# Patient Record
Sex: FEMALE | Race: WHITE | HISPANIC OR LATINO | Employment: FULL TIME | URBAN - METROPOLITAN AREA
[De-identification: names, ages, dates, MRNs, and addresses within clinical notes are randomized per-mention and may not be internally consistent; named-entity substitution may affect disease eponyms.]

---

## 2017-07-08 NOTE — Clinical Note
July 10, 2017        Molly Olson  [address] [place] [address]        Dear Molly:    This letter is to inform you the you are due for an annual appointment. Please contact our scheduling department at [phone] To schedule       If you have any questions or concerns, please don't hesitate to call.        Sincerely,        PATIENCE Bonner.    Electronically Signed

## 2017-07-10 RX ORDER — CITALOPRAM HYDROBROMIDE 10 MG/1
TABLET ORAL
Qty: 30 TAB | Refills: 1 | Status: ON HOLD | OUTPATIENT
Start: 2017-07-10 | End: 2021-06-22

## 2017-10-03 DIAGNOSIS — R79.89 ELEVATED TSH: ICD-10-CM

## 2017-10-03 RX ORDER — LEVOTHYROXINE SODIUM 0.05 MG/1
50 TABLET ORAL
Qty: 30 TAB | Refills: 1 | Status: ON HOLD | OUTPATIENT
Start: 2017-10-03 | End: 2021-06-22

## 2021-06-21 ENCOUNTER — HOSPITAL ENCOUNTER (EMERGENCY)
Facility: MEDICAL CENTER | Age: 36
End: 2021-06-21
Attending: EMERGENCY MEDICINE
Payer: COMMERCIAL

## 2021-06-21 ENCOUNTER — HOSPITAL ENCOUNTER (INPATIENT)
Facility: MEDICAL CENTER | Age: 36
LOS: 4 days | DRG: 519 | End: 2021-06-26
Attending: INTERNAL MEDICINE | Admitting: INTERNAL MEDICINE
Payer: COMMERCIAL

## 2021-06-21 VITALS
TEMPERATURE: 96.6 F | DIASTOLIC BLOOD PRESSURE: 62 MMHG | SYSTOLIC BLOOD PRESSURE: 117 MMHG | WEIGHT: 231.48 LBS | RESPIRATION RATE: 16 BRPM | OXYGEN SATURATION: 95 % | BODY MASS INDEX: 33.21 KG/M2 | HEART RATE: 94 BPM

## 2021-06-21 DIAGNOSIS — M54.9 INTRACTABLE BACK PAIN: ICD-10-CM

## 2021-06-21 DIAGNOSIS — M54.16 LUMBAR RADICULOPATHY, ACUTE: ICD-10-CM

## 2021-06-21 LAB
ALBUMIN SERPL BCP-MCNC: 4.2 G/DL (ref 3.2–4.9)
ALBUMIN/GLOB SERPL: 1.2 G/DL
ALP SERPL-CCNC: 85 U/L (ref 30–99)
ALT SERPL-CCNC: 21 U/L (ref 2–50)
ANION GAP SERPL CALC-SCNC: 12 MMOL/L (ref 7–16)
AST SERPL-CCNC: 15 U/L (ref 12–45)
BASOPHILS # BLD AUTO: 0.3 % (ref 0–1.8)
BASOPHILS # BLD: 0.04 K/UL (ref 0–0.12)
BILIRUB SERPL-MCNC: 0.6 MG/DL (ref 0.1–1.5)
BUN SERPL-MCNC: 15 MG/DL (ref 8–22)
CALCIUM SERPL-MCNC: 9.1 MG/DL (ref 8.4–10.2)
CHLORIDE SERPL-SCNC: 106 MMOL/L (ref 96–112)
CO2 SERPL-SCNC: 19 MMOL/L (ref 20–33)
CREAT SERPL-MCNC: 0.71 MG/DL (ref 0.5–1.4)
EOSINOPHIL # BLD AUTO: 0 K/UL (ref 0–0.51)
EOSINOPHIL NFR BLD: 0 % (ref 0–6.9)
ERYTHROCYTE [DISTWIDTH] IN BLOOD BY AUTOMATED COUNT: 38.5 FL (ref 35.9–50)
GLOBULIN SER CALC-MCNC: 3.4 G/DL (ref 1.9–3.5)
GLUCOSE SERPL-MCNC: 104 MG/DL (ref 65–99)
HCG SERPL QL: NEGATIVE
HCT VFR BLD AUTO: 47.3 % (ref 37–47)
HGB BLD-MCNC: 16.7 G/DL (ref 12–16)
IMM GRANULOCYTES # BLD AUTO: 0.07 K/UL (ref 0–0.11)
IMM GRANULOCYTES NFR BLD AUTO: 0.5 % (ref 0–0.9)
LYMPHOCYTES # BLD AUTO: 1.8 K/UL (ref 1–4.8)
LYMPHOCYTES NFR BLD: 12.1 % (ref 22–41)
MCH RBC QN AUTO: 28.9 PG (ref 27–33)
MCHC RBC AUTO-ENTMCNC: 35.3 G/DL (ref 33.6–35)
MCV RBC AUTO: 82 FL (ref 81.4–97.8)
MONOCYTES # BLD AUTO: 0.87 K/UL (ref 0–0.85)
MONOCYTES NFR BLD AUTO: 5.9 % (ref 0–13.4)
NEUTROPHILS # BLD AUTO: 12.07 K/UL (ref 2–7.15)
NEUTROPHILS NFR BLD: 81.2 % (ref 44–72)
NRBC # BLD AUTO: 0 K/UL
NRBC BLD-RTO: 0 /100 WBC
PLATELET # BLD AUTO: 315 K/UL (ref 164–446)
PMV BLD AUTO: 9.8 FL (ref 9–12.9)
POTASSIUM SERPL-SCNC: 3.9 MMOL/L (ref 3.6–5.5)
PROT SERPL-MCNC: 7.6 G/DL (ref 6–8.2)
RBC # BLD AUTO: 5.77 M/UL (ref 4.2–5.4)
SARS-COV+SARS-COV-2 AG RESP QL IA.RAPID: NOTDETECTED
SODIUM SERPL-SCNC: 137 MMOL/L (ref 135–145)
SPECIMEN SOURCE: NORMAL
WBC # BLD AUTO: 14.9 K/UL (ref 4.8–10.8)

## 2021-06-21 PROCEDURE — 99222 1ST HOSP IP/OBS MODERATE 55: CPT | Performed by: INTERNAL MEDICINE

## 2021-06-21 PROCEDURE — 87426 SARSCOV CORONAVIRUS AG IA: CPT

## 2021-06-21 PROCEDURE — 99285 EMERGENCY DEPT VISIT HI MDM: CPT

## 2021-06-21 PROCEDURE — G0378 HOSPITAL OBSERVATION PER HR: HCPCS

## 2021-06-21 PROCEDURE — 36415 COLL VENOUS BLD VENIPUNCTURE: CPT

## 2021-06-21 PROCEDURE — 700111 HCHG RX REV CODE 636 W/ 250 OVERRIDE (IP): Performed by: EMERGENCY MEDICINE

## 2021-06-21 PROCEDURE — 96374 THER/PROPH/DIAG INJ IV PUSH: CPT

## 2021-06-21 PROCEDURE — 85025 COMPLETE CBC W/AUTO DIFF WBC: CPT

## 2021-06-21 PROCEDURE — U0005 INFEC AGEN DETEC AMPLI PROBE: HCPCS

## 2021-06-21 PROCEDURE — 84703 CHORIONIC GONADOTROPIN ASSAY: CPT

## 2021-06-21 PROCEDURE — 80053 COMPREHEN METABOLIC PANEL: CPT

## 2021-06-21 PROCEDURE — 96375 TX/PRO/DX INJ NEW DRUG ADDON: CPT

## 2021-06-21 PROCEDURE — U0003 INFECTIOUS AGENT DETECTION BY NUCLEIC ACID (DNA OR RNA); SEVERE ACUTE RESPIRATORY SYNDROME CORONAVIRUS 2 (SARS-COV-2) (CORONAVIRUS DISEASE [COVID-19]), AMPLIFIED PROBE TECHNIQUE, MAKING USE OF HIGH THROUGHPUT TECHNOLOGIES AS DESCRIBED BY CMS-2020-01-R: HCPCS

## 2021-06-21 PROCEDURE — C9803 HOPD COVID-19 SPEC COLLECT: HCPCS | Performed by: EMERGENCY MEDICINE

## 2021-06-21 RX ORDER — CYCLOBENZAPRINE HCL 10 MG
10 TABLET ORAL 3 TIMES DAILY PRN
COMMUNITY

## 2021-06-21 RX ORDER — LORAZEPAM 2 MG/ML
INJECTION INTRAMUSCULAR
Status: DISCONTINUED
Start: 2021-06-21 | End: 2021-06-21 | Stop reason: HOSPADM

## 2021-06-21 RX ORDER — THYROID 30 MG/1
30 TABLET ORAL DAILY
COMMUNITY

## 2021-06-21 RX ORDER — HYDROMORPHONE HYDROCHLORIDE 1 MG/ML
1 INJECTION, SOLUTION INTRAMUSCULAR; INTRAVENOUS; SUBCUTANEOUS
Status: DISCONTINUED | OUTPATIENT
Start: 2021-06-21 | End: 2021-06-21 | Stop reason: HOSPADM

## 2021-06-21 RX ORDER — LORAZEPAM 2 MG/ML
2 INJECTION INTRAMUSCULAR ONCE
Status: COMPLETED | OUTPATIENT
Start: 2021-06-21 | End: 2021-06-21

## 2021-06-21 RX ORDER — GABAPENTIN 100 MG/1
100 CAPSULE ORAL 3 TIMES DAILY
COMMUNITY

## 2021-06-21 RX ORDER — KETOROLAC TROMETHAMINE 30 MG/ML
30 INJECTION, SOLUTION INTRAMUSCULAR; INTRAVENOUS ONCE
Status: COMPLETED | OUTPATIENT
Start: 2021-06-21 | End: 2021-06-21

## 2021-06-21 RX ORDER — METHYLPREDNISOLONE 4 MG/1
4-24 TABLET ORAL DAILY
Status: ON HOLD | COMMUNITY
Start: 2021-06-20 | End: 2021-06-26

## 2021-06-21 RX ADMIN — LORAZEPAM 2 MG: 2 INJECTION INTRAMUSCULAR; INTRAVENOUS at 18:50

## 2021-06-21 RX ADMIN — KETOROLAC TROMETHAMINE 30 MG: 30 INJECTION, SOLUTION INTRAMUSCULAR at 20:24

## 2021-06-21 RX ADMIN — HYDROMORPHONE HYDROCHLORIDE 1 MG: 1 INJECTION, SOLUTION INTRAMUSCULAR; INTRAVENOUS; SUBCUTANEOUS at 20:25

## 2021-06-21 ASSESSMENT — LIFESTYLE VARIABLES
TOTAL SCORE: 0
DOES PATIENT WANT TO STOP DRINKING: NO
HOW MANY TIMES IN THE PAST YEAR HAVE YOU HAD 5 OR MORE DRINKS IN A DAY: 0
ON A TYPICAL DAY WHEN YOU DRINK ALCOHOL HOW MANY DRINKS DO YOU HAVE: 2
EVER FELT BAD OR GUILTY ABOUT YOUR DRINKING: NO
HAVE YOU EVER FELT YOU SHOULD CUT DOWN ON YOUR DRINKING: NO
TOTAL SCORE: 0
CONSUMPTION TOTAL: NEGATIVE
AVERAGE NUMBER OF DAYS PER WEEK YOU HAVE A DRINK CONTAINING ALCOHOL: 1
EVER HAD A DRINK FIRST THING IN THE MORNING TO STEADY YOUR NERVES TO GET RID OF A HANGOVER: NO
TOTAL SCORE: 0
HAVE PEOPLE ANNOYED YOU BY CRITICIZING YOUR DRINKING: NO
ALCOHOL_USE: YES

## 2021-06-21 ASSESSMENT — PATIENT HEALTH QUESTIONNAIRE - PHQ9
8. MOVING OR SPEAKING SO SLOWLY THAT OTHER PEOPLE COULD HAVE NOTICED. OR THE OPPOSITE, BEING SO FIGETY OR RESTLESS THAT YOU HAVE BEEN MOVING AROUND A LOT MORE THAN USUAL: NEARLY EVERY DAY
1. LITTLE INTEREST OR PLEASURE IN DOING THINGS: NEARLY EVERY DAY
3. TROUBLE FALLING OR STAYING ASLEEP OR SLEEPING TOO MUCH: NOT AT ALL
7. TROUBLE CONCENTRATING ON THINGS, SUCH AS READING THE NEWSPAPER OR WATCHING TELEVISION: NOT AT ALL
SUM OF ALL RESPONSES TO PHQ9 QUESTIONS 1 AND 2: 6
4. FEELING TIRED OR HAVING LITTLE ENERGY: NEARLY EVERY DAY
SUM OF ALL RESPONSES TO PHQ QUESTIONS 1-9: 15
9. THOUGHTS THAT YOU WOULD BE BETTER OFF DEAD, OR OF HURTING YOURSELF: NOT AT ALL
5. POOR APPETITE OR OVEREATING: NEARLY EVERY DAY
6. FEELING BAD ABOUT YOURSELF - OR THAT YOU ARE A FAILURE OR HAVE LET YOURSELF OR YOUR FAMILY DOWN: NOT AL ALL
2. FEELING DOWN, DEPRESSED, IRRITABLE, OR HOPELESS: NEARLY EVERY DAY

## 2021-06-21 ASSESSMENT — PAIN DESCRIPTION - PAIN TYPE: TYPE: ACUTE PAIN

## 2021-06-21 ASSESSMENT — FIBROSIS 4 INDEX: FIB4 SCORE: 0.38

## 2021-06-22 PROBLEM — E66.01 MORBID OBESITY (HCC): Status: ACTIVE | Noted: 2021-06-22

## 2021-06-22 PROBLEM — R26.9 GAIT DISORDER: Status: ACTIVE | Noted: 2021-06-22

## 2021-06-22 PROBLEM — M54.9 INTRACTABLE BACK PAIN: Status: ACTIVE | Noted: 2021-06-22

## 2021-06-22 LAB
MAGNESIUM SERPL-MCNC: 2.2 MG/DL (ref 1.5–2.5)
SARS-COV-2 RNA RESP QL NAA+PROBE: NOTDETECTED
SPECIMEN SOURCE: NORMAL

## 2021-06-22 PROCEDURE — 96375 TX/PRO/DX INJ NEW DRUG ADDON: CPT

## 2021-06-22 PROCEDURE — 700111 HCHG RX REV CODE 636 W/ 250 OVERRIDE (IP): Performed by: PHYSICIAN ASSISTANT

## 2021-06-22 PROCEDURE — 700105 HCHG RX REV CODE 258: Performed by: STUDENT IN AN ORGANIZED HEALTH CARE EDUCATION/TRAINING PROGRAM

## 2021-06-22 PROCEDURE — 83735 ASSAY OF MAGNESIUM: CPT

## 2021-06-22 PROCEDURE — 700102 HCHG RX REV CODE 250 W/ 637 OVERRIDE(OP): Performed by: PHYSICIAN ASSISTANT

## 2021-06-22 PROCEDURE — 770006 HCHG ROOM/CARE - MED/SURG/GYN SEMI*

## 2021-06-22 PROCEDURE — 700102 HCHG RX REV CODE 250 W/ 637 OVERRIDE(OP): Performed by: INTERNAL MEDICINE

## 2021-06-22 PROCEDURE — 96376 TX/PRO/DX INJ SAME DRUG ADON: CPT

## 2021-06-22 PROCEDURE — A9270 NON-COVERED ITEM OR SERVICE: HCPCS | Performed by: INTERNAL MEDICINE

## 2021-06-22 PROCEDURE — A9270 NON-COVERED ITEM OR SERVICE: HCPCS | Performed by: NEUROLOGICAL SURGERY

## 2021-06-22 PROCEDURE — 99232 SBSQ HOSP IP/OBS MODERATE 35: CPT | Performed by: STUDENT IN AN ORGANIZED HEALTH CARE EDUCATION/TRAINING PROGRAM

## 2021-06-22 PROCEDURE — 96372 THER/PROPH/DIAG INJ SC/IM: CPT

## 2021-06-22 PROCEDURE — A9270 NON-COVERED ITEM OR SERVICE: HCPCS | Performed by: PHYSICIAN ASSISTANT

## 2021-06-22 PROCEDURE — 700102 HCHG RX REV CODE 250 W/ 637 OVERRIDE(OP): Performed by: NEUROLOGICAL SURGERY

## 2021-06-22 PROCEDURE — 36415 COLL VENOUS BLD VENIPUNCTURE: CPT

## 2021-06-22 PROCEDURE — 700111 HCHG RX REV CODE 636 W/ 250 OVERRIDE (IP): Performed by: INTERNAL MEDICINE

## 2021-06-22 RX ORDER — PROCHLORPERAZINE EDISYLATE 5 MG/ML
5-10 INJECTION INTRAMUSCULAR; INTRAVENOUS EVERY 4 HOURS PRN
Status: DISCONTINUED | OUTPATIENT
Start: 2021-06-22 | End: 2021-06-26 | Stop reason: HOSPADM

## 2021-06-22 RX ORDER — OXYCODONE HYDROCHLORIDE 5 MG/1
5 TABLET ORAL
Status: DISCONTINUED | OUTPATIENT
Start: 2021-06-22 | End: 2021-06-25

## 2021-06-22 RX ORDER — OXYCODONE HYDROCHLORIDE 5 MG/1
2.5 TABLET ORAL
Status: DISCONTINUED | OUTPATIENT
Start: 2021-06-22 | End: 2021-06-25

## 2021-06-22 RX ORDER — GABAPENTIN 100 MG/1
100 CAPSULE ORAL 3 TIMES DAILY
Status: DISCONTINUED | OUTPATIENT
Start: 2021-06-22 | End: 2021-06-22

## 2021-06-22 RX ORDER — ACETAMINOPHEN 500 MG
1000 TABLET ORAL EVERY 12 HOURS PRN
Status: ON HOLD | COMMUNITY
End: 2021-06-26

## 2021-06-22 RX ORDER — ONDANSETRON 2 MG/ML
4 INJECTION INTRAMUSCULAR; INTRAVENOUS EVERY 4 HOURS PRN
Status: DISCONTINUED | OUTPATIENT
Start: 2021-06-22 | End: 2021-06-25

## 2021-06-22 RX ORDER — LEVOTHYROXINE SODIUM 0.05 MG/1
50 TABLET ORAL
Status: DISCONTINUED | OUTPATIENT
Start: 2021-06-22 | End: 2021-06-26 | Stop reason: HOSPADM

## 2021-06-22 RX ORDER — ACETAMINOPHEN 325 MG/1
650 TABLET ORAL EVERY 6 HOURS PRN
Status: DISCONTINUED | OUTPATIENT
Start: 2021-06-22 | End: 2021-06-26 | Stop reason: HOSPADM

## 2021-06-22 RX ORDER — AMOXICILLIN 250 MG
2 CAPSULE ORAL 2 TIMES DAILY
Status: DISCONTINUED | OUTPATIENT
Start: 2021-06-22 | End: 2021-06-25

## 2021-06-22 RX ORDER — PROMETHAZINE HYDROCHLORIDE 25 MG/1
12.5-25 TABLET ORAL EVERY 4 HOURS PRN
Status: DISCONTINUED | OUTPATIENT
Start: 2021-06-22 | End: 2021-06-25

## 2021-06-22 RX ORDER — SODIUM CHLORIDE 9 MG/ML
INJECTION, SOLUTION INTRAVENOUS CONTINUOUS
Status: DISCONTINUED | OUTPATIENT
Start: 2021-06-22 | End: 2021-06-24

## 2021-06-22 RX ORDER — KETOROLAC TROMETHAMINE 30 MG/ML
15 INJECTION, SOLUTION INTRAMUSCULAR; INTRAVENOUS EVERY 6 HOURS
Status: COMPLETED | OUTPATIENT
Start: 2021-06-23 | End: 2021-06-23

## 2021-06-22 RX ORDER — ONDANSETRON 4 MG/1
4 TABLET, ORALLY DISINTEGRATING ORAL EVERY 4 HOURS PRN
Status: DISCONTINUED | OUTPATIENT
Start: 2021-06-22 | End: 2021-06-25

## 2021-06-22 RX ORDER — DIAZEPAM 2 MG/1
2 TABLET ORAL EVERY 6 HOURS PRN
Status: DISCONTINUED | OUTPATIENT
Start: 2021-06-22 | End: 2021-06-26 | Stop reason: HOSPADM

## 2021-06-22 RX ORDER — DEXAMETHASONE 6 MG/1
6 TABLET ORAL EVERY 12 HOURS
Status: DISCONTINUED | OUTPATIENT
Start: 2021-06-22 | End: 2021-06-22

## 2021-06-22 RX ORDER — LABETALOL HYDROCHLORIDE 5 MG/ML
10 INJECTION, SOLUTION INTRAVENOUS EVERY 4 HOURS PRN
Status: DISCONTINUED | OUTPATIENT
Start: 2021-06-22 | End: 2021-06-26 | Stop reason: HOSPADM

## 2021-06-22 RX ORDER — BISACODYL 10 MG
10 SUPPOSITORY, RECTAL RECTAL
Status: DISCONTINUED | OUTPATIENT
Start: 2021-06-22 | End: 2021-06-25

## 2021-06-22 RX ORDER — PROMETHAZINE HYDROCHLORIDE 25 MG/1
12.5-25 SUPPOSITORY RECTAL EVERY 4 HOURS PRN
Status: DISCONTINUED | OUTPATIENT
Start: 2021-06-22 | End: 2021-06-25

## 2021-06-22 RX ORDER — CLONIDINE HYDROCHLORIDE 0.1 MG/1
0.1 TABLET ORAL EVERY 6 HOURS PRN
Status: DISCONTINUED | OUTPATIENT
Start: 2021-06-22 | End: 2021-06-25

## 2021-06-22 RX ORDER — HEPARIN SODIUM 5000 [USP'U]/ML
5000 INJECTION, SOLUTION INTRAVENOUS; SUBCUTANEOUS EVERY 8 HOURS
Status: DISCONTINUED | OUTPATIENT
Start: 2021-06-22 | End: 2021-06-22

## 2021-06-22 RX ORDER — CITALOPRAM 20 MG/1
10 TABLET ORAL DAILY
Status: DISCONTINUED | OUTPATIENT
Start: 2021-06-22 | End: 2021-06-26 | Stop reason: HOSPADM

## 2021-06-22 RX ORDER — GABAPENTIN 300 MG/1
300 CAPSULE ORAL 3 TIMES DAILY
Status: DISCONTINUED | OUTPATIENT
Start: 2021-06-22 | End: 2021-06-26 | Stop reason: HOSPADM

## 2021-06-22 RX ORDER — CYCLOBENZAPRINE HCL 10 MG
10 TABLET ORAL 3 TIMES DAILY PRN
Status: DISCONTINUED | OUTPATIENT
Start: 2021-06-22 | End: 2021-06-26 | Stop reason: HOSPADM

## 2021-06-22 RX ORDER — IBUPROFEN 200 MG
1000 TABLET ORAL EVERY 12 HOURS PRN
Status: ON HOLD | COMMUNITY
End: 2021-06-26

## 2021-06-22 RX ORDER — DEXAMETHASONE 4 MG/1
4 TABLET ORAL EVERY 6 HOURS
Status: DISCONTINUED | OUTPATIENT
Start: 2021-06-22 | End: 2021-06-26

## 2021-06-22 RX ORDER — KETOROLAC TROMETHAMINE 30 MG/ML
30 INJECTION, SOLUTION INTRAMUSCULAR; INTRAVENOUS ONCE
Status: COMPLETED | OUTPATIENT
Start: 2021-06-22 | End: 2021-06-22

## 2021-06-22 RX ORDER — ENALAPRILAT 1.25 MG/ML
1.25 INJECTION INTRAVENOUS EVERY 6 HOURS PRN
Status: DISCONTINUED | OUTPATIENT
Start: 2021-06-22 | End: 2021-06-26 | Stop reason: HOSPADM

## 2021-06-22 RX ORDER — POLYETHYLENE GLYCOL 3350 17 G/17G
1 POWDER, FOR SOLUTION ORAL
Status: DISCONTINUED | OUTPATIENT
Start: 2021-06-22 | End: 2021-06-25

## 2021-06-22 RX ORDER — HYDROMORPHONE HYDROCHLORIDE 1 MG/ML
0.25 INJECTION, SOLUTION INTRAMUSCULAR; INTRAVENOUS; SUBCUTANEOUS
Status: DISCONTINUED | OUTPATIENT
Start: 2021-06-22 | End: 2021-06-25

## 2021-06-22 RX ADMIN — DEXAMETHASONE 4 MG: 4 TABLET ORAL at 23:53

## 2021-06-22 RX ADMIN — DEXAMETHASONE 4 MG: 4 TABLET ORAL at 13:26

## 2021-06-22 RX ADMIN — SODIUM CHLORIDE: 9 INJECTION, SOLUTION INTRAVENOUS at 21:17

## 2021-06-22 RX ADMIN — CYCLOBENZAPRINE 10 MG: 10 TABLET, FILM COATED ORAL at 05:41

## 2021-06-22 RX ADMIN — KETOROLAC TROMETHAMINE 30 MG: 60 INJECTION, SOLUTION INTRAMUSCULAR at 18:27

## 2021-06-22 RX ADMIN — HYDROMORPHONE HYDROCHLORIDE 0.25 MG: 1 INJECTION, SOLUTION INTRAMUSCULAR; INTRAVENOUS; SUBCUTANEOUS at 23:09

## 2021-06-22 RX ADMIN — HEPARIN SODIUM 5000 UNITS: 5000 INJECTION, SOLUTION INTRAVENOUS; SUBCUTANEOUS at 01:12

## 2021-06-22 RX ADMIN — DOCUSATE SODIUM 50 MG AND SENNOSIDES 8.6 MG 2 TABLET: 8.6; 5 TABLET, FILM COATED ORAL at 18:28

## 2021-06-22 RX ADMIN — DEXAMETHASONE 4 MG: 4 TABLET ORAL at 18:28

## 2021-06-22 RX ADMIN — DIAZEPAM 2 MG: 2 TABLET ORAL at 11:27

## 2021-06-22 RX ADMIN — GABAPENTIN 100 MG: 100 CAPSULE ORAL at 05:42

## 2021-06-22 RX ADMIN — DEXAMETHASONE 6 MG: 6 TABLET ORAL at 01:13

## 2021-06-22 RX ADMIN — OXYCODONE 5 MG: 5 TABLET ORAL at 09:56

## 2021-06-22 RX ADMIN — HEPARIN SODIUM 5000 UNITS: 5000 INJECTION, SOLUTION INTRAVENOUS; SUBCUTANEOUS at 09:56

## 2021-06-22 RX ADMIN — GABAPENTIN 300 MG: 300 CAPSULE ORAL at 13:26

## 2021-06-22 RX ADMIN — KETOROLAC TROMETHAMINE 15 MG: 60 INJECTION, SOLUTION INTRAMUSCULAR at 23:54

## 2021-06-22 RX ADMIN — SODIUM CHLORIDE: 9 INJECTION, SOLUTION INTRAVENOUS at 11:28

## 2021-06-22 RX ADMIN — CYCLOBENZAPRINE 10 MG: 10 TABLET, FILM COATED ORAL at 18:28

## 2021-06-22 RX ADMIN — GABAPENTIN 300 MG: 300 CAPSULE ORAL at 21:47

## 2021-06-22 RX ADMIN — OXYCODONE 5 MG: 5 TABLET ORAL at 21:59

## 2021-06-22 RX ADMIN — POLYETHYLENE GLYCOL 3350 1 PACKET: 17 POWDER, FOR SOLUTION ORAL at 21:59

## 2021-06-22 RX ADMIN — DOCUSATE SODIUM 50 MG AND SENNOSIDES 8.6 MG 2 TABLET: 8.6; 5 TABLET, FILM COATED ORAL at 05:42

## 2021-06-22 ASSESSMENT — COGNITIVE AND FUNCTIONAL STATUS - GENERAL
HELP NEEDED FOR BATHING: TOTAL
SUGGESTED CMS G CODE MODIFIER MOBILITY: CM
MOVING TO AND FROM BED TO CHAIR: UNABLE
WALKING IN HOSPITAL ROOM: TOTAL
MOVING FROM LYING ON BACK TO SITTING ON SIDE OF FLAT BED: UNABLE
MOBILITY SCORE: 8
TOILETING: TOTAL
STANDING UP FROM CHAIR USING ARMS: A LOT
DRESSING REGULAR LOWER BODY CLOTHING: TOTAL
DAILY ACTIVITIY SCORE: 15
SUGGESTED CMS G CODE MODIFIER DAILY ACTIVITY: CK
TURNING FROM BACK TO SIDE WHILE IN FLAT BAD: A LOT
CLIMB 3 TO 5 STEPS WITH RAILING: TOTAL

## 2021-06-22 ASSESSMENT — ENCOUNTER SYMPTOMS
WEAKNESS: 1
COUGH: 0
NERVOUS/ANXIOUS: 0
TINGLING: 0
HALLUCINATIONS: 0
BLURRED VISION: 0
HEMOPTYSIS: 0
VOMITING: 0
DOUBLE VISION: 0
ABDOMINAL PAIN: 0
DIZZINESS: 0
EYE DISCHARGE: 0
NAUSEA: 0
INSOMNIA: 0
STRIDOR: 0
MYALGIAS: 0
WEIGHT LOSS: 0
FOCAL WEAKNESS: 0
CHILLS: 0
PALPITATIONS: 0
EYE REDNESS: 0
BRUISES/BLEEDS EASILY: 0
BACK PAIN: 1
DIARRHEA: 0
CONSTIPATION: 0
SENSORY CHANGE: 0
SORE THROAT: 0
NECK PAIN: 0
DEPRESSION: 0
HEADACHES: 0
SHORTNESS OF BREATH: 0
FALLS: 0
FEVER: 0
HEARTBURN: 0
WHEEZING: 0

## 2021-06-22 ASSESSMENT — PAIN DESCRIPTION - PAIN TYPE
TYPE: ACUTE PAIN

## 2021-06-22 ASSESSMENT — LIFESTYLE VARIABLES: SUBSTANCE_ABUSE: 0

## 2021-06-22 NOTE — DIETARY
NUTRITION SERVICES: BMI - Pt with BMI >40 (=Body mass index is 41.12 kg/m².), Class III obesity. Weight loss counseling not appropriate in acute care setting.   RECOMMEND - If appropriate at DC please refer to outpatient nutrition services for weight management.

## 2021-06-22 NOTE — CARE PLAN
Problem: Pain - Standard  Goal: Alleviation of pain or a reduction in pain to the patient’s comfort goal  Outcome: Progressing     Problem: Fall Risk  Goal: Patient will remain free from falls  Outcome: Progressing     The patient is Watcher - Medium risk of patient condition declining or worsening    Shift Goals  Clinical Goals: pain management    Progress made toward(s) clinical / shift goals:  Pt is receiving multimodal pain medications, ice and heat therapy, and is scheduled for an IR epidural placement. Pt is still currently in extreme pain.    Patient is not progressing towards the following goals:

## 2021-06-22 NOTE — ED TRIAGE NOTES
Pt to ed by RUTH, chronic back pain.  Followed by [facility]  MRI done last Wednesday.    IV placed pta , 300 fentanyl and 4 of versed iv

## 2021-06-22 NOTE — PROGRESS NOTES
4 Eyes Skin Assessment Completed by LANE Gaytan and Azeb RN.    Head WDL  Ears WDL  Nose WDL  Mouth WDL  Neck WDL  Breast/Chest WDL  Shoulder Blades WDL  Spine WDL  (R) Arm/Elbow/Hand WDL  (L) Arm/Elbow/Hand WDL  Abdomen WDL  Groin WDL  Scrotum/Coccyx/Buttocks WDL  (R) Leg WDL  (L) Leg WDL  (R) Heel/Foot/Toe WDL  (L) Heel/Foot/Toe WDL          Devices In Places Pulse Ox      Interventions In Place Pressure Redistribution Mattress    Possible Skin Injury No    Pictures Uploaded Into Epic N/A  Wound Consult Placed N/A  RN Wound Prevention Protocol Ordered No

## 2021-06-22 NOTE — H&P
Hospital Medicine History & Physical Note    Date of Service  6/21/21    Primary Care Physician  Pcp Not In Computer    Consultants  Neurosurgery Dr. Richardson    Code Status  Full Code    Chief Complaint  Intractable low back pain    History of Presenting Illness  36 y.o. female who presented 6/21/2021 with acute on chronic intractable low back pain exacerbated over the last 2 weeks.  She had an MRI at [facility] last Wednesday, 5 days ago, it shows a large 1 cm right L4-L5 disc protrusion.  Patient has an appointment tomorrow with Dr. Richardson.  She called the office last night and they put her on Flexeril and a Medrol Dosepak she has been taking that.  Patient states she keeps on falling, is unable to make it to the bathroom, pain is severe, cannot put any weight on it.  Patient has a hard time discerning if she has focal weakness versus just pain with any movement of the lower leg.  Patient denies any incontinence, fevers, chest pain, shortness of breath, abdominal pains, nausea vomiting.    Review of Systems  Review of Systems   Constitutional: Negative for fever, malaise/fatigue and weight loss.   HENT: Negative for sore throat and tinnitus.    Eyes: Negative for blurred vision and double vision.   Respiratory: Negative for cough, hemoptysis and stridor.    Cardiovascular: Negative for chest pain and palpitations.   Gastrointestinal: Negative for nausea and vomiting.   Genitourinary: Negative for dysuria and urgency.   Musculoskeletal: Negative for myalgias and neck pain.   Skin: Negative for itching and rash.   Neurological: Positive for weakness. Negative for dizziness and headaches.   Endo/Heme/Allergies: Does not bruise/bleed easily.   Psychiatric/Behavioral: Negative for depression. The patient does not have insomnia.        Past Medical History  Morbid obesity    Surgical History   has a past surgical history that includes cholecystectomy (2002); breast biopsy (12/11/2012); and us-needle core bx-breast panel.      Family History  family history includes Arthritis in her mother; Cancer in her mother; Diabetes in her father and mother.     Social History   reports that she has never smoked. She has never used smokeless tobacco. She reports current alcohol use. She reports that she does not use drugs.    Allergies  No Known Allergies    Medications  Prior to Admission Medications   Prescriptions Last Dose Informant Patient Reported? Taking?   citalopram (CELEXA) 10 MG tablet 6/21/2021 at 1500  No No   Sig: TAKE 1 TABLET BY MOUTH EVERY DAY   cyclobenzaprine (FLEXERIL) 10 mg Tab 6/21/2021 at 1500 Patient Yes Yes   Sig: Take 10 mg by mouth 3 times a day as needed.   gabapentin (NEURONTIN) 100 MG Cap 6/21/2021 at 1500 Patient Yes Yes   Sig: Take 100 mg by mouth 3 times a day.   levothyroxine (SYNTHROID) 50 MCG Tab   No No   Sig: Take 1 Tab by mouth Every morning on an empty stomach.   methylPREDNISolone (MEDROL) 4 MG Tab 6/21/2021 at 1200 Patient Yes Yes   Sig: Take 4 mg by mouth every day.   thyroid (ARMOUR THYROID) 30 MG Tab 6/17/2021 Patient Yes Yes   Sig: Take 30 mg by mouth every day. Indications: Underactive Thyroid      Facility-Administered Medications: None       Physical Exam  Temp:  [36.8 °C (98.3 °F)] 36.8 °C (98.3 °F)  Pulse:  [94] 94  Resp:  [20] 20  BP: (123)/(85) 123/85  SpO2:  [90 %] 90 %    Physical Exam  Vitals and nursing note reviewed.   Constitutional:       General: She is in acute distress.      Appearance: Normal appearance. She is obese. She is not toxic-appearing.   HENT:      Head: Normocephalic and atraumatic.      Nose: Nose normal. No congestion or rhinorrhea.      Mouth/Throat:      Mouth: Mucous membranes are moist.      Pharynx: Oropharynx is clear.   Eyes:      Extraocular Movements: Extraocular movements intact.      Conjunctiva/sclera: Conjunctivae normal.      Pupils: Pupils are equal, round, and reactive to light.   Neck:      Vascular: No carotid bruit.   Cardiovascular:      Rate and  Rhythm: Normal rate and regular rhythm.      Pulses: Normal pulses.      Heart sounds: Normal heart sounds. No murmur heard.   No gallop.    Pulmonary:      Effort: No respiratory distress.      Breath sounds: Normal breath sounds. No wheezing or rales.   Abdominal:      General: Abdomen is flat. Bowel sounds are normal. There is no distension.      Palpations: Abdomen is soft. There is no mass.      Tenderness: There is no abdominal tenderness.      Hernia: No hernia is present.   Musculoskeletal:         General: Tenderness present. No swelling, deformity or signs of injury.      Cervical back: Normal range of motion and neck supple. No muscular tenderness.      Comments: Right-sided L5 paraspinal muscular tenderness   Lymphadenopathy:      Cervical: No cervical adenopathy.   Skin:     Capillary Refill: Capillary refill takes less than 2 seconds.      Coloration: Skin is not jaundiced or pale.      Findings: No bruising.   Neurological:      General: No focal deficit present.      Mental Status: She is alert and oriented to person, place, and time. Mental status is at baseline.      Cranial Nerves: No cranial nerve deficit.      Motor: No weakness.      Coordination: Coordination normal.   Psychiatric:         Mood and Affect: Mood normal.         Thought Content: Thought content normal.         Judgment: Judgment normal.         Laboratory:  Recent Labs     06/21/21  1830   WBC 14.9*   RBC 5.77*   HEMOGLOBIN 16.7*   HEMATOCRIT 47.3*   MCV 82.0   MCH 28.9   MCHC 35.3*   RDW 38.5   PLATELETCT 315   MPV 9.8     Recent Labs     06/21/21  1830   SODIUM 137   POTASSIUM 3.9   CHLORIDE 106   CO2 19*   GLUCOSE 104*   BUN 15   CREATININE 0.71   CALCIUM 9.1     Recent Labs     06/21/21  1830   ALTSGPT 21   ASTSGOT 15   ALKPHOSPHAT 85   TBILIRUBIN 0.6   GLUCOSE 104*         No results for input(s): NTPROBNP in the last 72 hours.      No results for input(s): TROPONINT in the last 72 hours.    Imaging:  No orders to display          Assessment/Plan:  I anticipate this patient will require at least two midnights for appropriate medical management, necessitating inpatient admission.    Intractable back pain  Assessment & Plan  Decadron, symptomatic management, follow-up neurosurgery consult Dr. Richardson and obtain interventional radiology consult for epidural in a.m.    Gait disorder  Assessment & Plan  Secondary intractable pain, follow-up physical therapy consult    Morbid obesity (HCC)  Assessment & Plan  Outpatient work-up

## 2021-06-22 NOTE — CARE PLAN
The patient is Stable - Low risk of patient condition declining or worsening         Progress made toward(s) clinical / shift goals:    Pt pain being with current regimen. Pain evaluated using the 0-10 scale. Pt oriented to the room and call light system.  Problem: Knowledge Deficit - Standard  Goal: Patient and family/care givers will demonstrate understanding of plan of care, disease process/condition, diagnostic tests and medications  Outcome: Progressing     Problem: Depression  Goal: Patient and family/caregiver will verbalize accurate information about at least two of the possible causes of depression, three-four of the signs and symptoms of depression  Outcome: Progressing     Problem: Skin Integrity  Goal: Skin integrity is maintained or improved  Outcome: Progressing

## 2021-06-22 NOTE — PROGRESS NOTES
Assumed care of patient at 2300. Pt arrived to the floor via [facility].  Patient is A&O x 4.  Bed alarm is on. Patient updated on plan of care and oriented to the room and call light system. Pt agreeable. Pt belongings at bedside include one bag of personal belongings, purse, phone, blanket and clothes. Call light is within reach. Hourly rounding in place.

## 2021-06-22 NOTE — HOSPITAL COURSE
36 y.o. female who presented 6/21/2021 with acute on chronic intractable low back pain exacerbated over the last 2 weeks.  She had an MRI at [facility] last Wednesday, 5 days ago, it shows a large 1 cm right L4-L5 disc protrusion.  Patient has an appointment tomorrow with Dr. Richardson.  She called the office last night and they put her on Flexeril and a Medrol Dosepak she has been taking that.  Patient states she keeps on falling, is unable to make it to the bathroom, pain is severe, cannot put any weight on it.  Patient has a hard time discerning if she has focal weakness versus just pain with any movement of the lower leg.  Patient denies any incontinence, fevers, chest pain, shortness of breath, abdominal pains, nausea vomiting.    NS recommended stat epidural steroids injection.

## 2021-06-22 NOTE — PROGRESS NOTES
Med Rec complete per Pt  Allergies Reviewed  No ABX in the last 14 days    Pt started a MEDROL kinjal on 6/20

## 2021-06-22 NOTE — PROGRESS NOTES
MRI at our office reviewed, spoke to patient via telemed, recommend inpatient right L4/5 TFESI, ASAP, and of fails that will do L4/5 micodiscectomy Friday if time can be obtained in OR, continue supportive care

## 2021-06-22 NOTE — ED PROVIDER NOTES
ED Provider Note    CHIEF COMPLAINT  Chief Complaint   Patient presents with   • Back Pain       HPI  Molly Olson is a 36 y.o. female who presents to the ED secondary to low back pain.  The patient has been having some lower back pain issues for the last several months if not a year.  The patient's states the pain got worse over the last 1 to 2 weeks, over the last 6 days has gotten much worse.  She had an MRI at [facility] last Wednesday, 5 days ago, it shows a large 1 cm right L4-L5 disc protrusion.  Patient has an appointment tomorrow with Dr. Richardson.  She called the office last night and they put her on Flexeril and a Medrol Dosepak she has been taking that.  Patient states she keeps on falling, is unable to make it to the bathroom, pain is severe, cannot put any weight on it.  Patient has a hard time discerning if she has focal weakness versus just pain with any movement of the lower leg.  Patient denies any fevers, chest pain, shortness of breath, abdominal pains, nausea vomiting.    REVIEW OF SYSTEMS  See HPI for further details. All other systems are negative.     PAST MEDICAL HISTORY  No past medical history on file.    FAMILY HISTORY  Family History   Problem Relation Age of Onset   • Cancer Mother    • Arthritis Mother    • Diabetes Mother    • Diabetes Father        SOCIAL HISTORY  Social History     Socioeconomic History   • Marital status:      Spouse name: Not on file   • Number of children: Not on file   • Years of education: Not on file   • Highest education level: Not on file   Occupational History   • Not on file   Tobacco Use   • Smoking status: Never Smoker   • Smokeless tobacco: Never Used   Substance and Sexual Activity   • Alcohol use: Yes     Alcohol/week: 0.0 oz     Comment: Occasionally    • Drug use: No   • Sexual activity: Yes     Partners: Male   Other Topics Concern   • Not on file   Social History Narrative   • Not on file     Social Determinants of Health     Financial  Resource Strain:    • Difficulty of Paying Living Expenses:    Food Insecurity:    • Worried About Running Out of Food in the Last Year:    • Ran Out of Food in the Last Year:    Transportation Needs:    • Lack of Transportation (Medical):    • Lack of Transportation (Non-Medical):    Physical Activity:    • Days of Exercise per Week:    • Minutes of Exercise per Session:    Stress:    • Feeling of Stress :    Social Connections:    • Frequency of Communication with Friends and Family:    • Frequency of Social Gatherings with Friends and Family:    • Attends [employer] Services:    • Active Member of Clubs or Organizations:    • Attends Club or Organization Meetings:    • Marital Status:    Intimate Partner Violence:    • Fear of Current or Ex-Partner:    • Emotionally Abused:    • Physically Abused:    • Sexually Abused:        SURGICAL HISTORY  Past Surgical History:   Procedure Laterality Date   • BREAST BIOPSY  12/11/2012    Performed by Gerri Grayson M.D. at SURGERY SAME DAY [facility] ORS   • CHOLECYSTECTOMY  2002   • US-NEEDLE CORE BX-BREAST PANEL         CURRENT MEDICATIONS  Home Medications    **Home medications have not yet been reviewed for this encounter**         ALLERGIES  No Known Allergies    PHYSICAL EXAM  VITAL SIGNS: BP (!) 161/89   Pulse 75   Temp 35.9 °C (96.6 °F) (Temporal)   Wt 105 kg (231 lb 7.7 oz)   SpO2 97%   BMI 33.21 kg/m²   Constitutional: Well developed, Well nourished, severe distress, Non-toxic appearance.   HENT:  Atraumatic, Normocephalic, Oral pharynx with moist mucous membranes.   Eyes:  EOMI, PERRL, no scleral icterus.  Neck: Normal range of motion, No tenderness, Supple, No stridor.   Cardiovascular: Normal heart rate, Normal rhythm,   Thorax & Lungs: Normal breath sounds, No respiratory distress, No wheezing, No chest tenderness.   Abdomen: Bowel sounds normal, Soft, No tenderness, No masses, No pulsatile masses.   Skin: Warm, Dry, No erythema, No rash.   Back: She is  tender palpation across the lower back and gluteal area, some tenderness throughout the leg but no soft tissue swelling or lesions seen  Extremities: Intact distal pulses, No edema, No tenderness.   Neurologic: Awake alert, cannot move her leg much at all she has normal sensation, no evidence of cauda equina syndrome, she has approximately 3 out of 5 dorsiflexion and plantarflexion of her right ankle, she is unable to lift or move her right leg otherwise.  RADIOLOGY/PROCEDURES  MRI from [facility] shows L4-L5 1 cm right-sided disc bulge    COURSE & MEDICAL DECISION MAKING  Pertinent Labs & Imaging studies reviewed. (See chart for details)  Patient with severe low back pain with radiculopathy I do not see any evidence of cauda equina although the patient's examination is difficult.  The patient states that she cannot feel her groin, she states it is painful.  She has normal sensation across the lower legs.  She can wiggle her toes.  Discussed the case with Dr. Richardson, I will transfer the patient to the main hospital, he will ask pain management to see the patient.  I will discussed the case with the hospitalist for direct admission to the hospital.        FINAL IMPRESSION  1. Lumbar radiculopathy, acute    .    Patient referred to primary care provider for blood pressure management    This dictation was created using voice recognition software. The accuracy of the dictation is limited to the abilities of the software. I expect there may be some errors of grammar and possibly content. The nursing notes were reviewed and certain aspects of this information were incorporated into this note.    Electronically signed by: Rex López M.D., 6/21/2021 6:36 PM

## 2021-06-22 NOTE — PROGRESS NOTES
Need to get MRI lumbar patient recently obtained to review. No emergent surgery currently. Pain control per hospitalist. Would benefit from IR staff doing right L4/5 TFESI. Will follow

## 2021-06-22 NOTE — PROGRESS NOTES
[facility] Medicine Daily Progress Note    Date of Service  6/22/2021    Chief Complaint  36 y.o. female admitted 6/21/2021 with acute on chronic intractable low back pain exacerbated over the last 2 weeks.    Hospital Course  36 y.o. female who presented 6/21/2021 with acute on chronic intractable low back pain exacerbated over the last 2 weeks.  She had an MRI at [facility] last Wednesday, 5 days ago, it shows a large 1 cm right L4-L5 disc protrusion.  Patient has an appointment tomorrow with Dr. Richardson.  She called the office last night and they put her on Flexeril and a Medrol Dosepak she has been taking that.  Patient states she keeps on falling, is unable to make it to the bathroom, pain is severe, cannot put any weight on it.  Patient has a hard time discerning if she has focal weakness versus just pain with any movement of the lower leg.  Patient denies any incontinence, fevers, chest pain, shortness of breath, abdominal pains, nausea vomiting.    NS recommended stat epidural steroids injection.       Interval Problem Update  I evaluated and examined the patient at the bedside.  Labs and imaging were reviewed. Care plan was discussed with the patient and bedside nurse.    significant pain on lower back; Not urinated since yesterday, but denies saddle anesthesia.  Denies right lower extremity pain.    NS recommended right L4-5 transforaminal epidural with IR to be completed ASAP.       Consultants/Specialty  NS  IR    Code Status  Full Code    Disposition  home    Review of Systems  Review of Systems   Constitutional: Negative for chills, fever, malaise/fatigue and weight loss.   HENT: Negative for congestion and sore throat.    Eyes: Negative for discharge and redness.   Respiratory: Negative for cough, shortness of breath and wheezing.    Cardiovascular: Negative for chest pain, palpitations and leg swelling.   Gastrointestinal: Negative for abdominal pain, constipation, diarrhea, heartburn, nausea and  vomiting.   Genitourinary: Negative for dysuria, frequency and urgency.        Anuria due to neurology cause,   Musculoskeletal: Positive for back pain. Negative for falls and joint pain.   Skin: Negative for itching and rash.   Neurological: Negative for dizziness, tingling, sensory change, focal weakness and headaches.   Psychiatric/Behavioral: Negative for depression, hallucinations and substance abuse. The patient is not nervous/anxious and does not have insomnia.    All other systems reviewed and are negative.       Physical Exam  Temp:  [36.7 °C (98 °F)-37.3 °C (99.1 °F)] 37.1 °C (98.8 °F)  Pulse:  [84-95] 95  Resp:  [16-20] 16  BP: (123-135)/(78-85) 132/82  SpO2:  [90 %-92 %] 91 %    Physical Exam  Vitals reviewed.   Constitutional:       General: She is in acute distress.      Appearance: Normal appearance. She is normal weight. She is not diaphoretic.   HENT:      Head: Normocephalic and atraumatic.      Right Ear: External ear normal.      Left Ear: External ear normal.      Nose: Nose normal.      Mouth/Throat:      Pharynx: Oropharynx is clear.   Eyes:      General:         Right eye: No discharge.         Left eye: No discharge.      Extraocular Movements: Extraocular movements intact.      Conjunctiva/sclera: Conjunctivae normal.      Pupils: Pupils are equal, round, and reactive to light.   Cardiovascular:      Rate and Rhythm: Normal rate and regular rhythm.      Pulses: Normal pulses.      Heart sounds: Normal heart sounds.   Pulmonary:      Effort: Pulmonary effort is normal. No respiratory distress.      Breath sounds: Normal breath sounds. No wheezing.   Abdominal:      General: Abdomen is flat. Bowel sounds are normal. There is no distension.      Palpations: Abdomen is soft.      Tenderness: There is no abdominal tenderness. There is no right CVA tenderness, left CVA tenderness, guarding or rebound.   Musculoskeletal:         General: No swelling, tenderness or deformity. Normal range of  motion.      Cervical back: Normal range of motion. No rigidity. No muscular tenderness.      Right lower leg: No edema.      Left lower leg: No edema.      Comments: Low back pain   Skin:     General: Skin is warm and dry.   Neurological:      General: No focal deficit present.      Mental Status: She is alert and oriented to person, place, and time. Mental status is at baseline.      Cranial Nerves: No cranial nerve deficit.      Sensory: No sensory deficit.      Gait: Gait normal.      Deep Tendon Reflexes: Reflexes normal.   Psychiatric:         Mood and Affect: Mood normal.         Behavior: Behavior normal.         Judgment: Judgment normal.         Fluids  No intake or output data in the 24 hours ending 06/22/21 1011    Laboratory  Recent Labs     06/21/21  1830   WBC 14.9*   RBC 5.77*   HEMOGLOBIN 16.7*   HEMATOCRIT 47.3*   MCV 82.0   MCH 28.9   MCHC 35.3*   RDW 38.5   PLATELETCT 315   MPV 9.8     Recent Labs     06/21/21  1830   SODIUM 137   POTASSIUM 3.9   CHLORIDE 106   CO2 19*   GLUCOSE 104*   BUN 15   CREATININE 0.71   CALCIUM 9.1                   Imaging  IR-CONSULT AND TREAT    (Results Pending)        Assessment/Plan  Gait disorder  Assessment & Plan  Secondary intractable pain, follow-up physical therapy consult    Morbid obesity (HCC)  Assessment & Plan  Outpatient work-up    Intractable back pain  Assessment & Plan  Decadron, symptomatic management,     NS recommended right L4-5 transforaminal epidural with IR to be completed ASAP.        VTE prophylaxis: scd  Hold AC for possible NS surgery

## 2021-06-22 NOTE — PROGRESS NOTES
Neurosurgery Progress Note    Subjective:  Patient has been dealing with increased right lower extremity pain for the past several weeks.  Has been evaluated as an outpatient with our office and MRI was recently completed.  Dr. Richardson spoke with the patient via telemedicine today and reviewed her MRI.  He has recommended right L4-5 transforaminal epidural with IR to be completed ASAP.   Patient states she has not urinated since yesterday, but denies saddle anesthesia.  Denies right lower extremity pain.      Exam:  Motor 3-4/5 diffusely to right LE, remainder 5/5.  Sensation decreased to right L4 thigh, L4 and L5 calf, L5 foot.    BP  Min: 123/85  Max: 135/78  Pulse  Av  Min: 84  Max: 95  Resp  Av  Min: 16  Max: 20  Temp  Av °C (98.6 °F)  Min: 36.7 °C (98 °F)  Max: 37.3 °C (99.1 °F)  SpO2  Av.8 %  Min: 90 %  Max: 92 %    No data recorded    Recent Labs     21  1830   WBC 14.9*   RBC 5.77*   HEMOGLOBIN 16.7*   HEMATOCRIT 47.3*   MCV 82.0   MCH 28.9   MCHC 35.3*   RDW 38.5   PLATELETCT 315   MPV 9.8     Recent Labs     21  1830   SODIUM 137   POTASSIUM 3.9   CHLORIDE 106   CO2 19*   GLUCOSE 104*   BUN 15   CREATININE 0.71   CALCIUM 9.1               Intake/Output     None          No intake or output data in the 24 hours ending 21 0958         • citalopram  10 mg DAILY   • cyclobenzaprine  10 mg TID PRN   • gabapentin  100 mg TID   • levothyroxine  50 mcg AM ES   • senna-docusate  2 tablet BID    And   • polyethylene glycol/lytes  1 Packet QDAY PRN    And   • magnesium hydroxide  30 mL QDAY PRN    And   • bisacodyl  10 mg QDAY PRN   • heparin  5,000 Units Q8HRS   • acetaminophen  650 mg Q6HRS PRN   • Pharmacy Consult Request  1 Each PHARMACY TO DOSE   • oxyCODONE immediate-release  2.5 mg Q3HRS PRN    Or   • oxyCODONE immediate-release  5 mg Q3HRS PRN    Or   • HYDROmorphone  0.25 mg Q3HRS PRN   • cloNIDine  0.1 mg Q6HRS PRN   • enalaprilat  1.25 mg Q6HRS PRN   • labetalol  10 mg  Q4HRS PRN   • ondansetron  4 mg Q4HRS PRN   • ondansetron  4 mg Q4HRS PRN   • promethazine  12.5-25 mg Q4HRS PRN   • promethazine  12.5-25 mg Q4HRS PRN   • prochlorperazine  5-10 mg Q4HRS PRN   • dexamethasone  4 mg Q6HRS       Assessment and Plan:  Hospital day #2  Right L4/5 TF JAVED with IR today ASAP.  Straight cath now, repeat every 6 hours prn no void, and place cowart on third attempt if needed.  Will increase Neurontin to 300 mg TID.  Valium now for breakthrough spasm, continue flexeril scheduled.  If fails conservative care, then Dr. Richardson is planning for MIS right L4-5 microdiskectomy on Friday this week.  Appreciate medical care.  Following.   Prophylactic anticoagulation: no         Start date/time: TBD

## 2021-06-23 ENCOUNTER — APPOINTMENT (OUTPATIENT)
Dept: RADIOLOGY | Facility: MEDICAL CENTER | Age: 36
DRG: 519 | End: 2021-06-23
Attending: STUDENT IN AN ORGANIZED HEALTH CARE EDUCATION/TRAINING PROGRAM
Payer: COMMERCIAL

## 2021-06-23 PROBLEM — D72.829 LEUKOCYTOSIS: Status: ACTIVE | Noted: 2021-06-23

## 2021-06-23 LAB
APTT PPP: 32.4 SEC (ref 24.7–36)
ERYTHROCYTE [DISTWIDTH] IN BLOOD BY AUTOMATED COUNT: 40.2 FL (ref 35.9–50)
HCT VFR BLD AUTO: 42.1 % (ref 37–47)
HGB BLD-MCNC: 14.1 G/DL (ref 12–16)
INR PPP: 1.19 (ref 0.87–1.13)
MCH RBC QN AUTO: 28.1 PG (ref 27–33)
MCHC RBC AUTO-ENTMCNC: 33.5 G/DL (ref 33.6–35)
MCV RBC AUTO: 83.9 FL (ref 81.4–97.8)
PLATELET # BLD AUTO: 316 K/UL (ref 164–446)
PMV BLD AUTO: 10.5 FL (ref 9–12.9)
PROTHROMBIN TIME: 14.8 SEC (ref 12–14.6)
RBC # BLD AUTO: 5.02 M/UL (ref 4.2–5.4)
WBC # BLD AUTO: 13.3 K/UL (ref 4.8–10.8)

## 2021-06-23 PROCEDURE — 36415 COLL VENOUS BLD VENIPUNCTURE: CPT

## 2021-06-23 PROCEDURE — 85730 THROMBOPLASTIN TIME PARTIAL: CPT

## 2021-06-23 PROCEDURE — 700111 HCHG RX REV CODE 636 W/ 250 OVERRIDE (IP): Performed by: PHYSICIAN ASSISTANT

## 2021-06-23 PROCEDURE — 96376 TX/PRO/DX INJ SAME DRUG ADON: CPT

## 2021-06-23 PROCEDURE — A9270 NON-COVERED ITEM OR SERVICE: HCPCS | Performed by: PHYSICIAN ASSISTANT

## 2021-06-23 PROCEDURE — A9270 NON-COVERED ITEM OR SERVICE: HCPCS | Performed by: INTERNAL MEDICINE

## 2021-06-23 PROCEDURE — 700105 HCHG RX REV CODE 258: Performed by: STUDENT IN AN ORGANIZED HEALTH CARE EDUCATION/TRAINING PROGRAM

## 2021-06-23 PROCEDURE — 85027 COMPLETE CBC AUTOMATED: CPT

## 2021-06-23 PROCEDURE — 700102 HCHG RX REV CODE 250 W/ 637 OVERRIDE(OP): Performed by: PHYSICIAN ASSISTANT

## 2021-06-23 PROCEDURE — A9270 NON-COVERED ITEM OR SERVICE: HCPCS | Performed by: NEUROLOGICAL SURGERY

## 2021-06-23 PROCEDURE — 700102 HCHG RX REV CODE 250 W/ 637 OVERRIDE(OP): Performed by: INTERNAL MEDICINE

## 2021-06-23 PROCEDURE — 700102 HCHG RX REV CODE 250 W/ 637 OVERRIDE(OP): Performed by: NEUROLOGICAL SURGERY

## 2021-06-23 PROCEDURE — 99232 SBSQ HOSP IP/OBS MODERATE 35: CPT | Performed by: FAMILY MEDICINE

## 2021-06-23 PROCEDURE — 85610 PROTHROMBIN TIME: CPT

## 2021-06-23 PROCEDURE — 770006 HCHG ROOM/CARE - MED/SURG/GYN SEMI*

## 2021-06-23 PROCEDURE — 700111 HCHG RX REV CODE 636 W/ 250 OVERRIDE (IP): Performed by: INTERNAL MEDICINE

## 2021-06-23 RX ADMIN — KETOROLAC TROMETHAMINE 15 MG: 60 INJECTION, SOLUTION INTRAMUSCULAR at 13:04

## 2021-06-23 RX ADMIN — DEXAMETHASONE 4 MG: 4 TABLET ORAL at 05:38

## 2021-06-23 RX ADMIN — KETOROLAC TROMETHAMINE 15 MG: 60 INJECTION, SOLUTION INTRAMUSCULAR at 05:38

## 2021-06-23 RX ADMIN — GABAPENTIN 300 MG: 300 CAPSULE ORAL at 05:38

## 2021-06-23 RX ADMIN — DEXAMETHASONE 4 MG: 4 TABLET ORAL at 23:12

## 2021-06-23 RX ADMIN — CYCLOBENZAPRINE 10 MG: 10 TABLET, FILM COATED ORAL at 22:37

## 2021-06-23 RX ADMIN — HYDROMORPHONE HYDROCHLORIDE 0.25 MG: 1 INJECTION, SOLUTION INTRAMUSCULAR; INTRAVENOUS; SUBCUTANEOUS at 23:35

## 2021-06-23 RX ADMIN — DEXAMETHASONE 4 MG: 4 TABLET ORAL at 18:09

## 2021-06-23 RX ADMIN — SODIUM CHLORIDE: 9 INJECTION, SOLUTION INTRAVENOUS at 20:39

## 2021-06-23 RX ADMIN — CYCLOBENZAPRINE 10 MG: 10 TABLET, FILM COATED ORAL at 09:27

## 2021-06-23 RX ADMIN — OXYCODONE 5 MG: 5 TABLET ORAL at 22:36

## 2021-06-23 RX ADMIN — OXYCODONE 5 MG: 5 TABLET ORAL at 13:15

## 2021-06-23 RX ADMIN — POLYETHYLENE GLYCOL 3350 1 PACKET: 17 POWDER, FOR SOLUTION ORAL at 22:36

## 2021-06-23 RX ADMIN — DEXAMETHASONE 4 MG: 4 TABLET ORAL at 13:04

## 2021-06-23 RX ADMIN — DOCUSATE SODIUM 50 MG AND SENNOSIDES 8.6 MG 2 TABLET: 8.6; 5 TABLET, FILM COATED ORAL at 18:09

## 2021-06-23 RX ADMIN — DOCUSATE SODIUM 50 MG AND SENNOSIDES 8.6 MG 2 TABLET: 8.6; 5 TABLET, FILM COATED ORAL at 05:38

## 2021-06-23 RX ADMIN — GABAPENTIN 300 MG: 300 CAPSULE ORAL at 22:36

## 2021-06-23 RX ADMIN — GABAPENTIN 300 MG: 300 CAPSULE ORAL at 13:04

## 2021-06-23 ASSESSMENT — ENCOUNTER SYMPTOMS
WEAKNESS: 1
WHEEZING: 0
TINGLING: 1
BLURRED VISION: 0
DIZZINESS: 0
ABDOMINAL PAIN: 0
FEVER: 0
CHILLS: 0
VOMITING: 0
NAUSEA: 0
DIAPHORESIS: 0
COUGH: 0
SORE THROAT: 0
FLANK PAIN: 0
PALPITATIONS: 0
SHORTNESS OF BREATH: 0
HEARTBURN: 0
DIARRHEA: 0
SPEECH CHANGE: 0
SENSORY CHANGE: 1
HEADACHES: 0
FOCAL WEAKNESS: 1
MYALGIAS: 0
NERVOUS/ANXIOUS: 0

## 2021-06-23 ASSESSMENT — PAIN DESCRIPTION - PAIN TYPE
TYPE: ACUTE PAIN
TYPE: ACUTE PAIN
TYPE: ACUTE PAIN;NEUROPATHIC PAIN
TYPE: ACUTE PAIN
TYPE: ACUTE PAIN
TYPE: ACUTE PAIN;NEUROPATHIC PAIN
TYPE: ACUTE PAIN;CHRONIC PAIN

## 2021-06-23 NOTE — CONSULTS
DATE OF SERVICE:  06/22/2021     INPATIENT CONSULTATION     CHIEF COMPLAINT:  Back pain, leg pain, disk herniation.     HISTORY OF PRESENT ILLNESS:  This is a 36-year-old woman seen as an outpatient   several weeks ago with approximately 6 weeks at that time of back and right   radicular pain that was consistent with an L4-L5 radiculopathy, the medial   ankle and top of the foot.  She got an outpatient MRI that had progression of   symptoms.  She had no acute inciting event.  She was admitted to the ER.  She   has some urinary retention, felt likely secondary to pain.  She has severe   intractable radiculopathy that has been unresponsive to muscle relaxants and   the Medrol Dosepak.  Her MRI from our office as an outpatient shows a L4-L5   slightly rostrally migrated disk herniation on the right.  It causes foraminal   narrowing.  This is consistent with her symptoms.  There is no central   critical occlusion at all.     PAST MEDICAL HISTORY:  Significant for obesity.     PAST SURGICAL HISTORY:  Includes breast biopsy, gallbladder.     FAMILY HISTORY:  Noncontributory.     SOCIAL HISTORY:  She is a nonsmoker, social drinker.  There is family at her   bedside.     PHYSICAL EXAMINATION:  GENERAL:  This woman is in distress.  NEUROLOGIC:  She has a positive straight leg raise sign on the right, just   sitting in bed.  She has intact sensation in lower extremity dermatomes with   possibly some left L5 light touch dysesthesia.  On the right, she has pain   limitation to her distal lower exam of the right lower extremity, left lower   extremity is full.  Dorsiflexion, EHL and plantar flexion is 4+.  Proximally,   she has full strength.     ASSESSMENT AND PLAN:  The patient has a disk herniation.  We would like her to   give a Medrol, continue the medications.  I think she is a candidate for   neurointerventional radiology, epidural, right L4-L5 TFESI will be our choice.    Unfortunately, she is in significant pain despite  medications.  This may be   a case where we have to end up doing a diskectomy, later trial out these   medications over 2 days.  We will plan tentatively for surgery on Friday for a   minimally invasive L4-L5 microdiskectomy with foraminotomy.  The risks and   benefits were explained to the patient including pain, infection, bleeding,   CSF leak, failure to completely resolve symptoms, neurologic deficit,   weakness, numbness, bladder or bowel difficulties, iatrogenic destabilization   requiring fusion in the future and reherniation of the disk to be determined   at the time of surgery.  We will see how she does over the next couple days.    She admitted to the hospitalist service.  We appreciate their assistance.  We   deeply appreciate.     Thanks for allowing me to participate in her care.        ______________________________  MD KAT Terrell III/ELIO    DD:  06/22/2021 22:29  DT:  06/23/2021 00:17    Job#:  [id number]

## 2021-06-23 NOTE — CARE PLAN
The patient is Stable - Low risk of patient condition declining or worsening    Shift Goals  Clinical Goals: Pain management     Progress made toward(s) clinical / shift goals:    Pt pain is being managed with medications per MAR.   Problem: Knowledge Deficit - Standard  Goal: Patient and family/care givers will demonstrate understanding of plan of care, disease process/condition, diagnostic tests and medications  Outcome: Progressing     Problem: Depression  Goal: Patient and family/caregiver will verbalize accurate information about at least two of the possible causes of depression, three-four of the signs and symptoms of depression  Outcome: Progressing     Problem: Skin Integrity  Goal: Skin integrity is maintained or improved  Outcome: Progressing     Problem: Pain - Standard  Goal: Alleviation of pain or a reduction in pain to the patient’s comfort goal  Outcome: Progressing     Problem: Fall Risk  Goal: Patient will remain free from falls  Outcome: Progressing

## 2021-06-23 NOTE — PROGRESS NOTES
Pt is A&O x4  Pt is in extreme pain but being managed per MAR to reach a tolerable pain level.   denies nausea  Tolerating a cardiac diet   + Voids  + BM  Up x2 assist pivot to the commode  Bed alarm is on, pt moderate fall risk per oleg haider  Reviewed plan of care with patient, bed in lowest position and locked, pt resting comfortably now, call light within reach, all needs met at this time. Interventions will be executed per plan of care

## 2021-06-23 NOTE — CARE PLAN
Problem: Skin Integrity  Goal: Skin integrity is maintained or improved  Outcome: Met     Problem: Pain - Standard  Goal: Alleviation of pain or a reduction in pain to the patient’s comfort goal  Outcome: Progressing     Problem: Fall Risk  Goal: Patient will remain free from falls  Outcome: Progressing     Problem: Urinary Elimination  Goal: Establish and maintain regular urinary output  Outcome: Progressing     The patient is Stable - Low risk of patient condition declining or worsening    Shift Goals  Clinical Goals: normal urination    Progress made toward(s) clinical / shift goals:  Pt is up to the commode to urinate and is not retaining based on Q6 bladder scans. Pt pain is tolerable with multimodal pain medications and muscle relaxers. Pt calls appropriately and shifts her weight independently in bed despite not wanting to ambulate.    Patient is not progressing towards the following goals:

## 2021-06-23 NOTE — PROGRESS NOTES
[facility] Medicine Daily Progress Note    Date of Service  6/23/2021    Chief Complaint  36 y.o. female admitted 6/21/2021 with intractable back pain.    Hospital Course  Admitted with intractable back pain, neurosurgery was consulted on the case.  MRI showed an L4-L5 migrated disc herniation on the right causing foraminal narrowing.  There was no central critical occlusion noted.  Plan was interventional radiology consult for an epidural.    Interval Problem Update  Back pain -pain better controlled    Consultants/Specialty  Neurosurgery    Code Status  Full Code    Disposition  TBD    Review of Systems  Review of Systems   Constitutional: Negative for chills, diaphoresis, fever and malaise/fatigue.   HENT: Negative for congestion, hearing loss and sore throat.    Eyes: Negative for blurred vision.   Respiratory: Negative for cough, shortness of breath and wheezing.    Cardiovascular: Negative for chest pain, palpitations and leg swelling.   Gastrointestinal: Negative for abdominal pain, diarrhea, heartburn, nausea and vomiting.   Genitourinary: Negative for dysuria, flank pain and hematuria.   Musculoskeletal: Positive for back pain. Negative for joint pain, myalgias and neck pain.   Skin: Negative for rash.   Neurological: Positive for tingling, sensory change, focal weakness and weakness. Negative for dizziness, speech change and headaches.   Psychiatric/Behavioral: The patient is not nervous/anxious.         Physical Exam  Temp:  [36.4 °C (97.5 °F)-36.8 °C (98.3 °F)] 36.4 °C (97.6 °F)  Pulse:  [60-80] 60  Resp:  [18] 18  BP: (118-137)/(64-79) 118/70  SpO2:  [90 %-94 %] 94 %    Physical Exam  Vitals and nursing note reviewed.   Constitutional:       Appearance: She is obese.   HENT:      Head: Normocephalic and atraumatic.      Nose: No congestion.      Mouth/Throat:      Mouth: Mucous membranes are moist.   Eyes:      Extraocular Movements: Extraocular movements intact.      Conjunctiva/sclera: Conjunctivae  normal.      Pupils: Pupils are equal, round, and reactive to light.   Cardiovascular:      Rate and Rhythm: Normal rate and regular rhythm.   Pulmonary:      Effort: Pulmonary effort is normal.      Breath sounds: Normal breath sounds.   Abdominal:      General: Bowel sounds are normal. There is no distension.      Palpations: Abdomen is soft.      Tenderness: There is no abdominal tenderness. There is no guarding or rebound.   Musculoskeletal:      Cervical back: No tenderness.      Right lower leg: No edema.      Left lower leg: No edema.   Skin:     General: Skin is warm and dry.   Neurological:      Mental Status: She is alert and oriented to person, place, and time.      Cranial Nerves: No cranial nerve deficit.      Motor: Weakness (MMT BUE 5/5, LLE 4+/5, RLE 4/5 ) present.         Fluids    Intake/Output Summary (Last 24 hours) at 6/23/2021 1244  Last data filed at 6/23/2021 1001  Gross per 24 hour   Intake 911.67 ml   Output --   Net 911.67 ml       Laboratory  Recent Labs     06/21/21  1830 06/23/21  0420   WBC 14.9* 13.3*   RBC 5.77* 5.02   HEMOGLOBIN 16.7* 14.1   HEMATOCRIT 47.3* 42.1   MCV 82.0 83.9   MCH 28.9 28.1   MCHC 35.3* 33.5*   RDW 38.5 40.2   PLATELETCT 315 316   MPV 9.8 10.5     Recent Labs     06/21/21  1830   SODIUM 137   POTASSIUM 3.9   CHLORIDE 106   CO2 19*   GLUCOSE 104*   BUN 15   CREATININE 0.71   CALCIUM 9.1     Recent Labs     06/23/21  0420   APTT 32.4   INR 1.19*               Imaging  No orders to display        Assessment/Plan  * Intractable back pain- (present on admission)  Assessment & Plan  Decadron, pain control  Neurosurgery following  For surgery on 6/25/2021      Leukocytosis- (present on admission)  Assessment & Plan  Likely secondary to steroids    Elevated TSH- (present on admission)  Assessment & Plan  Synthroid, check TSH    Morbid obesity (HCC)- (present on admission)  Assessment & Plan  Outpatient work-up         VTE prophylaxis: SCD

## 2021-06-23 NOTE — PROGRESS NOTES
Neurosurgery Progress Note    Subjective:  Patient has been dealing with increased right lower extremity pain for the past several weeks.  Has been evaluated as an outpatient with our office and MRI was recently completed.  Pain much better controlled today.   Dr. Richardson evaluated patient yesterday and indicated that she does not need to pursue the epidural with IR, and the patient is ok with this, and that he would proceed with surgery on Friday this week.  Denies left lower extremity pain.      Exam:  Motor 3-4/5 diffusely to right LE, remainder 5/5.  Sensation decreased to right L4 thigh, L4 and L5 calf, L5 foot.    BP  Min: 118/70  Max: 137/79  Pulse  Av  Min: 60  Max: 80  Resp  Av  Min: 18  Max: 18  Temp  Av.6 °C (97.8 °F)  Min: 36.4 °C (97.5 °F)  Max: 36.8 °C (98.3 °F)  SpO2  Av.7 %  Min: 90 %  Max: 94 %    No data recorded    Recent Labs     21  1830 21  0420   WBC 14.9* 13.3*   RBC 5.77* 5.02   HEMOGLOBIN 16.7* 14.1   HEMATOCRIT 47.3* 42.1   MCV 82.0 83.9   MCH 28.9 28.1   MCHC 35.3* 33.5*   RDW 38.5 40.2   PLATELETCT 315 316   MPV 9.8 10.5     Recent Labs     21  183   SODIUM 137   POTASSIUM 3.9   CHLORIDE 106   CO2 19*   GLUCOSE 104*   BUN 15   CREATININE 0.71   CALCIUM 9.1     Recent Labs     21  0420   APTT 32.4   INR 1.19*           Intake/Output                       21 0700 - 21 0659 21 07 - 21 0659      [id number] Total  8003-6822 Total                 Intake    P.O.  0  390 390  120  -- 120    P.O. 0 390 390 120 -- 120    I.V.  455  -- 455  --  -- --    Volume (mL) (NS infusion) 455 -- 455 -- -- --    Total Intake 455 390 845 120 -- 120       Output    Urine  1000  -- 1000  --  -- --    Number of Times Voided -- 1 x 1 x 1 x -- 1 x    Straight Catheter 1000 -- 1000 -- -- --    Stool  --  -- --  --  -- --    Number of Times Stooled -- 1 x 1 x -- -- --    Total Output 1000 -- 1000 -- -- --       Net I/O     -155 766  -155 120 -- 120            Intake/Output Summary (Last 24 hours) at 6/23/2021 1147  Last data filed at 6/23/2021 1001  Gross per 24 hour   Intake 965 ml   Output --   Net 965 ml       $ Bladder Scan Results (mL): 249    • citalopram  10 mg DAILY   • cyclobenzaprine  10 mg TID PRN   • levothyroxine  50 mcg AM ES   • senna-docusate  2 tablet BID    And   • polyethylene glycol/lytes  1 Packet QDAY PRN    And   • magnesium hydroxide  30 mL QDAY PRN    And   • bisacodyl  10 mg QDAY PRN   • acetaminophen  650 mg Q6HRS PRN   • Pharmacy Consult Request  1 Each PHARMACY TO DOSE   • oxyCODONE immediate-release  2.5 mg Q3HRS PRN    Or   • oxyCODONE immediate-release  5 mg Q3HRS PRN    Or   • HYDROmorphone  0.25 mg Q3HRS PRN   • cloNIDine  0.1 mg Q6HRS PRN   • enalaprilat  1.25 mg Q6HRS PRN   • labetalol  10 mg Q4HRS PRN   • ondansetron  4 mg Q4HRS PRN   • ondansetron  4 mg Q4HRS PRN   • promethazine  12.5-25 mg Q4HRS PRN   • promethazine  12.5-25 mg Q4HRS PRN   • prochlorperazine  5-10 mg Q4HRS PRN   • dexamethasone  4 mg Q6HRS   • NS   Continuous   • diazePAM  2 mg Q6HRS PRN   • gabapentin  300 mg TID   • ketorolac  15 mg Q6HRS       Assessment and Plan:  Hospital day #3  Continue current meds.  Dr. Richardson is planning for MIS right L4-5 microdiskectomy on Friday this week.  Appreciate medical care.  Following.   Prophylactic anticoagulation: no         Start date/time: TBD

## 2021-06-24 LAB
ANION GAP SERPL CALC-SCNC: 9 MMOL/L (ref 7–16)
BASOPHILS # BLD AUTO: 0.2 % (ref 0–1.8)
BASOPHILS # BLD: 0.02 K/UL (ref 0–0.12)
BUN SERPL-MCNC: 19 MG/DL (ref 8–22)
CALCIUM SERPL-MCNC: 8.1 MG/DL (ref 8.5–10.5)
CHLORIDE SERPL-SCNC: 111 MMOL/L (ref 96–112)
CO2 SERPL-SCNC: 19 MMOL/L (ref 20–33)
CREAT SERPL-MCNC: 0.68 MG/DL (ref 0.5–1.4)
EOSINOPHIL # BLD AUTO: 0 K/UL (ref 0–0.51)
EOSINOPHIL NFR BLD: 0 % (ref 0–6.9)
ERYTHROCYTE [DISTWIDTH] IN BLOOD BY AUTOMATED COUNT: 39.9 FL (ref 35.9–50)
GLUCOSE SERPL-MCNC: 137 MG/DL (ref 65–99)
HCT VFR BLD AUTO: 40.2 % (ref 37–47)
HGB BLD-MCNC: 14 G/DL (ref 12–16)
IMM GRANULOCYTES # BLD AUTO: 0.06 K/UL (ref 0–0.11)
IMM GRANULOCYTES NFR BLD AUTO: 0.5 % (ref 0–0.9)
LYMPHOCYTES # BLD AUTO: 1.49 K/UL (ref 1–4.8)
LYMPHOCYTES NFR BLD: 11.8 % (ref 22–41)
MCH RBC QN AUTO: 28.7 PG (ref 27–33)
MCHC RBC AUTO-ENTMCNC: 34.8 G/DL (ref 33.6–35)
MCV RBC AUTO: 82.4 FL (ref 81.4–97.8)
MONOCYTES # BLD AUTO: 0.57 K/UL (ref 0–0.85)
MONOCYTES NFR BLD AUTO: 4.5 % (ref 0–13.4)
NEUTROPHILS # BLD AUTO: 10.54 K/UL (ref 2–7.15)
NEUTROPHILS NFR BLD: 83 % (ref 44–72)
NRBC # BLD AUTO: 0 K/UL
NRBC BLD-RTO: 0 /100 WBC
PLATELET # BLD AUTO: 299 K/UL (ref 164–446)
PMV BLD AUTO: 10 FL (ref 9–12.9)
POTASSIUM SERPL-SCNC: 4.1 MMOL/L (ref 3.6–5.5)
RBC # BLD AUTO: 4.88 M/UL (ref 4.2–5.4)
SODIUM SERPL-SCNC: 139 MMOL/L (ref 135–145)
TSH SERPL DL<=0.005 MIU/L-ACNC: 0.81 UIU/ML (ref 0.38–5.33)
WBC # BLD AUTO: 12.7 K/UL (ref 4.8–10.8)

## 2021-06-24 PROCEDURE — 302135 SEQUENTIAL COMPRESSION MACHINE: Performed by: FAMILY MEDICINE

## 2021-06-24 PROCEDURE — 700102 HCHG RX REV CODE 250 W/ 637 OVERRIDE(OP): Performed by: NEUROLOGICAL SURGERY

## 2021-06-24 PROCEDURE — 85025 COMPLETE CBC W/AUTO DIFF WBC: CPT

## 2021-06-24 PROCEDURE — 99232 SBSQ HOSP IP/OBS MODERATE 35: CPT | Performed by: FAMILY MEDICINE

## 2021-06-24 PROCEDURE — 84443 ASSAY THYROID STIM HORMONE: CPT

## 2021-06-24 PROCEDURE — 36415 COLL VENOUS BLD VENIPUNCTURE: CPT

## 2021-06-24 PROCEDURE — A9270 NON-COVERED ITEM OR SERVICE: HCPCS | Performed by: FAMILY MEDICINE

## 2021-06-24 PROCEDURE — A9270 NON-COVERED ITEM OR SERVICE: HCPCS | Performed by: NEUROLOGICAL SURGERY

## 2021-06-24 PROCEDURE — 700102 HCHG RX REV CODE 250 W/ 637 OVERRIDE(OP): Performed by: INTERNAL MEDICINE

## 2021-06-24 PROCEDURE — 770006 HCHG ROOM/CARE - MED/SURG/GYN SEMI*

## 2021-06-24 PROCEDURE — A9270 NON-COVERED ITEM OR SERVICE: HCPCS | Performed by: PHYSICIAN ASSISTANT

## 2021-06-24 PROCEDURE — A9270 NON-COVERED ITEM OR SERVICE: HCPCS | Performed by: INTERNAL MEDICINE

## 2021-06-24 PROCEDURE — 700102 HCHG RX REV CODE 250 W/ 637 OVERRIDE(OP): Performed by: PHYSICIAN ASSISTANT

## 2021-06-24 PROCEDURE — 80048 BASIC METABOLIC PNL TOTAL CA: CPT

## 2021-06-24 PROCEDURE — 700102 HCHG RX REV CODE 250 W/ 637 OVERRIDE(OP): Performed by: FAMILY MEDICINE

## 2021-06-24 RX ORDER — FAMOTIDINE 20 MG/1
20 TABLET, FILM COATED ORAL 2 TIMES DAILY
Status: DISCONTINUED | OUTPATIENT
Start: 2021-06-24 | End: 2021-06-26 | Stop reason: HOSPADM

## 2021-06-24 RX ADMIN — GABAPENTIN 300 MG: 300 CAPSULE ORAL at 12:51

## 2021-06-24 RX ADMIN — DEXAMETHASONE 4 MG: 4 TABLET ORAL at 11:30

## 2021-06-24 RX ADMIN — GABAPENTIN 300 MG: 300 CAPSULE ORAL at 21:17

## 2021-06-24 RX ADMIN — CYCLOBENZAPRINE 10 MG: 10 TABLET, FILM COATED ORAL at 18:42

## 2021-06-24 RX ADMIN — DEXAMETHASONE 4 MG: 4 TABLET ORAL at 04:37

## 2021-06-24 RX ADMIN — FAMOTIDINE 20 MG: 20 TABLET ORAL at 18:42

## 2021-06-24 RX ADMIN — OXYCODONE 5 MG: 5 TABLET ORAL at 23:30

## 2021-06-24 RX ADMIN — DIAZEPAM 2 MG: 2 TABLET ORAL at 04:41

## 2021-06-24 RX ADMIN — OXYCODONE 2.5 MG: 5 TABLET ORAL at 12:49

## 2021-06-24 RX ADMIN — DEXAMETHASONE 4 MG: 4 TABLET ORAL at 18:42

## 2021-06-24 RX ADMIN — DEXAMETHASONE 4 MG: 4 TABLET ORAL at 23:29

## 2021-06-24 RX ADMIN — OXYCODONE 2.5 MG: 5 TABLET ORAL at 11:29

## 2021-06-24 RX ADMIN — GABAPENTIN 300 MG: 300 CAPSULE ORAL at 04:37

## 2021-06-24 ASSESSMENT — PAIN DESCRIPTION - PAIN TYPE
TYPE: ACUTE PAIN;NEUROPATHIC PAIN

## 2021-06-24 ASSESSMENT — ENCOUNTER SYMPTOMS
FLANK PAIN: 0
SHORTNESS OF BREATH: 0
NERVOUS/ANXIOUS: 0
PALPITATIONS: 0
FOCAL WEAKNESS: 1
NAUSEA: 0
SORE THROAT: 0
DIAPHORESIS: 0
CHILLS: 0
WEAKNESS: 1
VOMITING: 0
WHEEZING: 0
COUGH: 0
TINGLING: 1
BLURRED VISION: 0
MYALGIAS: 0
DIARRHEA: 0
SPEECH CHANGE: 0
DIZZINESS: 0
FEVER: 0
HEADACHES: 0
HEARTBURN: 0
ABDOMINAL PAIN: 0
SENSORY CHANGE: 1

## 2021-06-24 NOTE — PROGRESS NOTES
Bedside shift report received from LANE Barker.  Safety check complete.  All patient needs met at this time.  Will continue to monitor.

## 2021-06-24 NOTE — PROGRESS NOTES
Neurosurgery Progress Note    Subjective:  Patient has been dealing with increased right lower extremity pain for the past several weeks.  Has been evaluated as an outpatient with our office and MRI was recently completed.  Pain much better controlled today.   Still with right lower extremity pain and decreased mobility d/t this.   Denies left lower extremity pain.      Exam:  Motor 3-4/5 diffusely to right LE, remainder 5/5.  Sensation decreased to right L4 thigh, L4 and L5 calf, L5 foot.    BP  Min: 118/87  Max: 139/73  Pulse  Av.8  Min: 52  Max: 93  Resp  Av.3  Min: 16  Max: 18  Temp  Av.9 °C (98.5 °F)  Min: 36.4 °C (97.6 °F)  Max: 37.8 °C (100 °F)  SpO2  Av.3 %  Min: 92 %  Max: 94 %    No data recorded    Recent Labs     21  1830 21  0420 21  0300   WBC 14.9* 13.3* 12.7*   RBC 5.77* 5.02 4.88   HEMOGLOBIN 16.7* 14.1 14.0   HEMATOCRIT 47.3* 42.1 40.2   MCV 82.0 83.9 82.4   MCH 28.9 28.1 28.7   MCHC 35.3* 33.5* 34.8   RDW 38.5 40.2 39.9   PLATELETCT 315 316 299   MPV 9.8 10.5 10.0     Recent Labs     21  1830 21  0300   SODIUM 137 139   POTASSIUM 3.9 4.1   CHLORIDE 106 111   CO2 19* 19*   GLUCOSE 104* 137*   BUN 15 19   CREATININE 0.71 0.68   CALCIUM 9.1 8.1*     Recent Labs     21  0420   APTT 32.4   INR 1.19*           Intake/Output                       21 07 - 21 0659 21 - 21 0659     [id number] Total 1900-0659 Total                 Intake    P.O.  1020  240 1260  600  -- 600    P.O. 8213 064 4052 600 -- 600    I.V.  1200  -- 1200  -- 2000    Volume (mL) (NS infusion) 1200 -- 1200 -- 2000    Total Intake 2220 240 2460 2600 -- 2600       Output    Urine  --  -- --  --  -- --    Number of Times Voided 1 x 1 x 2 x 1 x -- 1 x    Stool  --  -- --  --  -- --    Number of Times Stooled -- 1 x 1 x -- -- --    Total Output -- -- -- -- -- --       Net I/O     2220 240 2460 2600 -- 2600             Intake/Output Summary (Last 24 hours) at 6/24/2021 1211  Last data filed at 6/24/2021 1200  Gross per 24 hour   Intake 3590 ml   Output --   Net 3590 ml       $ Bladder Scan Results (mL): 30    • famotidine  20 mg BID   • citalopram  10 mg DAILY   • cyclobenzaprine  10 mg TID PRN   • levothyroxine  50 mcg AM ES   • senna-docusate  2 tablet BID    And   • polyethylene glycol/lytes  1 Packet QDAY PRN    And   • magnesium hydroxide  30 mL QDAY PRN    And   • bisacodyl  10 mg QDAY PRN   • acetaminophen  650 mg Q6HRS PRN   • Pharmacy Consult Request  1 Each PHARMACY TO DOSE   • oxyCODONE immediate-release  2.5 mg Q3HRS PRN    Or   • oxyCODONE immediate-release  5 mg Q3HRS PRN    Or   • HYDROmorphone  0.25 mg Q3HRS PRN   • cloNIDine  0.1 mg Q6HRS PRN   • enalaprilat  1.25 mg Q6HRS PRN   • labetalol  10 mg Q4HRS PRN   • ondansetron  4 mg Q4HRS PRN   • ondansetron  4 mg Q4HRS PRN   • promethazine  12.5-25 mg Q4HRS PRN   • promethazine  12.5-25 mg Q4HRS PRN   • prochlorperazine  5-10 mg Q4HRS PRN   • dexamethasone  4 mg Q6HRS   • diazePAM  2 mg Q6HRS PRN   • gabapentin  300 mg TID       Assessment and Plan:  Hospital day #4  Continue current meds.  Dr. Richardson is planning for MIS right L4-5 microdiskectomy tomorrow.  NPO at midnight tonight.    Appreciate medical care.  Following.   Prophylactic anticoagulation: no         Start date/time: TBD

## 2021-06-24 NOTE — CARE PLAN
Problem: Pain - Standard  Goal: Alleviation of pain or a reduction in pain to the patient’s comfort goal  Outcome: Progressing     Problem: Urinary Elimination  Goal: Establish and maintain regular urinary output  Outcome: Met     Problem: Fluid Volume  Goal: Fluid volume balance will be maintained  Outcome: Progressing     The patient is Stable - Low risk of patient condition declining or worsening    Shift Goals  Clinical Goals: normal urination without retention    Progress made toward(s) clinical / shift goals:  Pt is up to bathroom to urinate and is emptying bladder on her own.  Pt is urinating at least once every 6 hours. PT PO intake is increasing, pt is more hydrated. Pt encouraged to hydrate, IV fluids being discontinued. Pain is managed with multimodal medications, heat and ice.    Patient is not progressing towards the following goals:

## 2021-06-24 NOTE — PROGRESS NOTES
[facility] Medicine Daily Progress Note    Date of Service  6/24/2021    Chief Complaint  36 y.o. female admitted 6/21/2021 with intractable back pain.    Hospital Course  Admitted with intractable back pain, Neurosurgery was consulted on the case.  MRI showed an L4-L5 migrated disc herniation on the right causing foraminal narrowing.  There was no central critical occlusion noted.  Plan was Interventional radiology consult for an epidural.  However it appeared that scheduling might be delayed.  Plan of care was then changed to surgical intervention.    Interval Problem Update  Back pain - pain better controlled, plan for surgery tomorrow    Updates given and plan of care discussed with patient's sister who was at bedside.    Consultants/Specialty  Neurosurgery    Code Status  Full Code    Disposition  TBD    Review of Systems  Review of Systems   Constitutional: Negative for chills, diaphoresis, fever and malaise/fatigue.   HENT: Negative for congestion, hearing loss and sore throat.    Eyes: Negative for blurred vision.   Respiratory: Negative for cough, shortness of breath and wheezing.    Cardiovascular: Negative for chest pain, palpitations and leg swelling.   Gastrointestinal: Negative for abdominal pain, diarrhea, heartburn, nausea and vomiting.   Genitourinary: Negative for dysuria, flank pain and hematuria.   Musculoskeletal: Positive for back pain. Negative for joint pain, myalgias and neck pain.   Skin: Negative for rash.   Neurological: Positive for tingling, sensory change, focal weakness and weakness. Negative for dizziness, speech change and headaches.   Psychiatric/Behavioral: The patient is not nervous/anxious.         Physical Exam  Temp:  [36.4 °C (97.6 °F)-37.8 °C (100 °F)] 36.4 °C (97.6 °F)  Pulse:  [52-93] 93  Resp:  [16-18] 16  BP: (118-139)/(48-87) 121/48  SpO2:  [92 %-94 %] 93 %    Physical Exam  Vitals and nursing note reviewed.   Constitutional:       Appearance: She is obese.   HENT:       Head: Normocephalic and atraumatic.      Nose: No congestion.      Mouth/Throat:      Mouth: Mucous membranes are moist.   Eyes:      Extraocular Movements: Extraocular movements intact.      Conjunctiva/sclera: Conjunctivae normal.      Pupils: Pupils are equal, round, and reactive to light.   Cardiovascular:      Rate and Rhythm: Normal rate and regular rhythm.   Pulmonary:      Effort: Pulmonary effort is normal.      Breath sounds: Normal breath sounds.   Abdominal:      General: Bowel sounds are normal. There is no distension.      Palpations: Abdomen is soft.      Tenderness: There is no abdominal tenderness. There is no guarding or rebound.   Musculoskeletal:      Cervical back: No tenderness.      Right lower leg: No edema.      Left lower leg: No edema.   Skin:     General: Skin is warm and dry.   Neurological:      Mental Status: She is alert and oriented to person, place, and time.      Cranial Nerves: No cranial nerve deficit.      Motor: Weakness (MMT BUE 5/5, LLE 4+/5, RLE 4/5 ) present.         Fluids    Intake/Output Summary (Last 24 hours) at 6/24/2021 1137  Last data filed at 6/24/2021 0800  Gross per 24 hour   Intake 3440 ml   Output --   Net 3440 ml       Laboratory  Recent Labs     06/21/21  1830 06/23/21  0420 06/24/21  0300   WBC 14.9* 13.3* 12.7*   RBC 5.77* 5.02 4.88   HEMOGLOBIN 16.7* 14.1 14.0   HEMATOCRIT 47.3* 42.1 40.2   MCV 82.0 83.9 82.4   MCH 28.9 28.1 28.7   MCHC 35.3* 33.5* 34.8   RDW 38.5 40.2 39.9   PLATELETCT 315 316 299   MPV 9.8 10.5 10.0     Recent Labs     06/21/21  1830 06/24/21  0300   SODIUM 137 139   POTASSIUM 3.9 4.1   CHLORIDE 106 111   CO2 19* 19*   GLUCOSE 104* 137*   BUN 15 19   CREATININE 0.71 0.68   CALCIUM 9.1 8.1*     Recent Labs     06/23/21  0420   APTT 32.4   INR 1.19*               Imaging  No orders to display        Assessment/Plan  * Intractable back pain- (present on admission)  Assessment & Plan  Decadron, pain control  Neurosurgery following  For  surgery on 6/25/2021      Leukocytosis- (present on admission)  Assessment & Plan  Likely secondary to steroids    Elevated TSH- (present on admission)  Assessment & Plan  Synthroid    Morbid obesity (HCC)- (present on admission)  Assessment & Plan  Body mass index is 41.12 kg/m².       VTE prophylaxis: SCD

## 2021-06-24 NOTE — CARE PLAN
The patient is: Stable - Low risk of patient condition declining or worsening    Progress made toward(s) clinical / shift goals:  Patient remains hemodynamically stable.  Patient is free from falls.  Patient reports constipation- bowel regimen in use.  Pain relatively controlled with prescribed medications.    Patient is not progressing towards the following goals:  N/A          Problem: Knowledge Deficit - Standard  Goal: Patient and family/care givers will demonstrate understanding of plan of care, disease process/condition, diagnostic tests and medications  Outcome: Progressing     Problem: Depression  Goal: Patient and family/caregiver will verbalize accurate information about at least two of the possible causes of depression, three-four of the signs and symptoms of depression  Outcome: Progressing     Problem: Pain - Standard  Goal: Alleviation of pain or a reduction in pain to the patient’s comfort goal  Outcome: Progressing     Problem: Fall Risk  Goal: Patient will remain free from falls  Outcome: Progressing     Problem: Urinary Elimination  Goal: Establish and maintain regular urinary output  Outcome: Progressing     Problem: Psychosocial  Goal: Patient's level of anxiety will decrease  Outcome: Progressing  Goal: Patient's ability to verbalize feelings about condition will improve  Outcome: Progressing  Goal: Patient's ability to re-evaluate and adapt role responsibilities will improve  Outcome: Progressing  Goal: Patient and family will demonstrate ability to cope with life altering diagnosis and/or procedure  Outcome: Progressing  Goal: Spiritual and cultural needs incorporated into hospitalization  Outcome: Progressing     Problem: Communication  Goal: The ability to communicate needs accurately and effectively will improve  Outcome: Progressing     Problem: Discharge Barriers/Planning  Goal: Patient's continuum of care needs are met  Outcome: Progressing     Problem: Hemodynamics  Goal: Patient's  hemodynamics, fluid balance and neurologic status will be stable or improve  Outcome: Progressing     Problem: Respiratory  Goal: Patient will achieve/maintain optimum respiratory ventilation and gas exchange  Outcome: Progressing     Problem: Fluid Volume  Goal: Fluid volume balance will be maintained  Outcome: Progressing     Problem: Nutrition  Goal: Patient's nutritional and fluid intake will be adequate or improve  Outcome: Progressing  Goal: Enteral nutrition will be maintained or improve  Outcome: Progressing  Goal: Enteral nutrition will be maintained or improve  Outcome: Progressing     Problem: Pain - Standard  Goal: Alleviation of pain or a reduction in pain to the patient’s comfort goal  Outcome: Progressing     Problem: Gastrointestinal Irritability  Goal: Nausea and vomiting will be absent or improve  Outcome: Progressing  Goal: Diarrhea will be absent or improved  Outcome: Progressing     Problem: Mobility  Goal: Patient's capacity to carry out activities will improve  Outcome: Progressing     Problem: Self Care  Goal: Patient will have the ability to perform ADLs independently or with assistance (bathe, groom, dress, toilet and feed)  Outcome: Progressing     Problem: Infection - Standard  Goal: Patient will remain free from infection  Outcome: Progressing

## 2021-06-25 ENCOUNTER — APPOINTMENT (OUTPATIENT)
Dept: RADIOLOGY | Facility: MEDICAL CENTER | Age: 36
DRG: 519 | End: 2021-06-25
Attending: NEUROLOGICAL SURGERY
Payer: COMMERCIAL

## 2021-06-25 ENCOUNTER — ANESTHESIA EVENT (OUTPATIENT)
Dept: SURGERY | Facility: MEDICAL CENTER | Age: 36
DRG: 519 | End: 2021-06-25
Payer: COMMERCIAL

## 2021-06-25 ENCOUNTER — ANESTHESIA (OUTPATIENT)
Dept: SURGERY | Facility: MEDICAL CENTER | Age: 36
DRG: 519 | End: 2021-06-25
Payer: COMMERCIAL

## 2021-06-25 LAB
ANION GAP SERPL CALC-SCNC: 11 MMOL/L (ref 7–16)
BASOPHILS # BLD AUTO: 0.1 % (ref 0–1.8)
BASOPHILS # BLD: 0.02 K/UL (ref 0–0.12)
BUN SERPL-MCNC: 16 MG/DL (ref 8–22)
CALCIUM SERPL-MCNC: 8.2 MG/DL (ref 8.5–10.5)
CHLORIDE SERPL-SCNC: 107 MMOL/L (ref 96–112)
CO2 SERPL-SCNC: 20 MMOL/L (ref 20–33)
CREAT SERPL-MCNC: 0.71 MG/DL (ref 0.5–1.4)
EOSINOPHIL # BLD AUTO: 0 K/UL (ref 0–0.51)
EOSINOPHIL NFR BLD: 0 % (ref 0–6.9)
ERYTHROCYTE [DISTWIDTH] IN BLOOD BY AUTOMATED COUNT: 40.7 FL (ref 35.9–50)
GLUCOSE SERPL-MCNC: 133 MG/DL (ref 65–99)
HCG SERPL QL: NEGATIVE
HCT VFR BLD AUTO: 42 % (ref 37–47)
HGB BLD-MCNC: 14.6 G/DL (ref 12–16)
IMM GRANULOCYTES # BLD AUTO: 0.08 K/UL (ref 0–0.11)
IMM GRANULOCYTES NFR BLD AUTO: 0.6 % (ref 0–0.9)
LYMPHOCYTES # BLD AUTO: 1.7 K/UL (ref 1–4.8)
LYMPHOCYTES NFR BLD: 12 % (ref 22–41)
MCH RBC QN AUTO: 29.1 PG (ref 27–33)
MCHC RBC AUTO-ENTMCNC: 34.8 G/DL (ref 33.6–35)
MCV RBC AUTO: 83.7 FL (ref 81.4–97.8)
MONOCYTES # BLD AUTO: 0.65 K/UL (ref 0–0.85)
MONOCYTES NFR BLD AUTO: 4.6 % (ref 0–13.4)
NEUTROPHILS # BLD AUTO: 11.76 K/UL (ref 2–7.15)
NEUTROPHILS NFR BLD: 82.7 % (ref 44–72)
NRBC # BLD AUTO: 0 K/UL
NRBC BLD-RTO: 0 /100 WBC
PLATELET # BLD AUTO: 294 K/UL (ref 164–446)
PMV BLD AUTO: 10.8 FL (ref 9–12.9)
POTASSIUM SERPL-SCNC: 3.8 MMOL/L (ref 3.6–5.5)
RBC # BLD AUTO: 5.02 M/UL (ref 4.2–5.4)
SODIUM SERPL-SCNC: 138 MMOL/L (ref 135–145)
WBC # BLD AUTO: 14.2 K/UL (ref 4.8–10.8)

## 2021-06-25 PROCEDURE — 770006 HCHG ROOM/CARE - MED/SURG/GYN SEMI*

## 2021-06-25 PROCEDURE — 700111 HCHG RX REV CODE 636 W/ 250 OVERRIDE (IP): Performed by: ANESTHESIOLOGY

## 2021-06-25 PROCEDURE — 700102 HCHG RX REV CODE 250 W/ 637 OVERRIDE(OP): Performed by: PHYSICIAN ASSISTANT

## 2021-06-25 PROCEDURE — 96366 THER/PROPH/DIAG IV INF ADDON: CPT

## 2021-06-25 PROCEDURE — 700111 HCHG RX REV CODE 636 W/ 250 OVERRIDE (IP): Performed by: NEUROLOGICAL SURGERY

## 2021-06-25 PROCEDURE — 95925 SOMATOSENSORY TESTING: CPT | Performed by: NEUROLOGICAL SURGERY

## 2021-06-25 PROCEDURE — 502240 HCHG MISC OR SUPPLY RC 0272: Performed by: NEUROLOGICAL SURGERY

## 2021-06-25 PROCEDURE — 01NR0ZZ RELEASE SACRAL NERVE, OPEN APPROACH: ICD-10-PCS | Performed by: NEUROLOGICAL SURGERY

## 2021-06-25 PROCEDURE — 700105 HCHG RX REV CODE 258: Performed by: PHYSICIAN ASSISTANT

## 2021-06-25 PROCEDURE — G0378 HOSPITAL OBSERVATION PER HR: HCPCS

## 2021-06-25 PROCEDURE — 95937 NEUROMUSCULAR JUNCTION TEST: CPT | Performed by: NEUROLOGICAL SURGERY

## 2021-06-25 PROCEDURE — 700101 HCHG RX REV CODE 250: Performed by: ANESTHESIOLOGY

## 2021-06-25 PROCEDURE — 0SB40ZZ EXCISION OF LUMBOSACRAL DISC, OPEN APPROACH: ICD-10-PCS | Performed by: NEUROLOGICAL SURGERY

## 2021-06-25 PROCEDURE — 85025 COMPLETE CBC W/AUTO DIFF WBC: CPT

## 2021-06-25 PROCEDURE — 700101 HCHG RX REV CODE 250: Performed by: NEUROLOGICAL SURGERY

## 2021-06-25 PROCEDURE — A9270 NON-COVERED ITEM OR SERVICE: HCPCS | Performed by: NEUROLOGICAL SURGERY

## 2021-06-25 PROCEDURE — 700102 HCHG RX REV CODE 250 W/ 637 OVERRIDE(OP): Performed by: NEUROLOGICAL SURGERY

## 2021-06-25 PROCEDURE — 160009 HCHG ANES TIME/MIN: Performed by: NEUROLOGICAL SURGERY

## 2021-06-25 PROCEDURE — 700102 HCHG RX REV CODE 250 W/ 637 OVERRIDE(OP): Performed by: FAMILY MEDICINE

## 2021-06-25 PROCEDURE — 01NB0ZZ RELEASE LUMBAR NERVE, OPEN APPROACH: ICD-10-PCS | Performed by: NEUROLOGICAL SURGERY

## 2021-06-25 PROCEDURE — A9270 NON-COVERED ITEM OR SERVICE: HCPCS | Performed by: FAMILY MEDICINE

## 2021-06-25 PROCEDURE — 700111 HCHG RX REV CODE 636 W/ 250 OVERRIDE (IP): Performed by: PHYSICIAN ASSISTANT

## 2021-06-25 PROCEDURE — 36415 COLL VENOUS BLD VENIPUNCTURE: CPT

## 2021-06-25 PROCEDURE — 72020 X-RAY EXAM OF SPINE 1 VIEW: CPT

## 2021-06-25 PROCEDURE — 95940 IONM IN OPERATNG ROOM 15 MIN: CPT | Performed by: NEUROLOGICAL SURGERY

## 2021-06-25 PROCEDURE — 160041 HCHG SURGERY MINUTES - EA ADDL 1 MIN LEVEL 4: Performed by: NEUROLOGICAL SURGERY

## 2021-06-25 PROCEDURE — A9270 NON-COVERED ITEM OR SERVICE: HCPCS | Performed by: PHYSICIAN ASSISTANT

## 2021-06-25 PROCEDURE — 160002 HCHG RECOVERY MINUTES (STAT): Performed by: NEUROLOGICAL SURGERY

## 2021-06-25 PROCEDURE — 95861 NEEDLE EMG 2 EXTREMITIES: CPT | Performed by: NEUROLOGICAL SURGERY

## 2021-06-25 PROCEDURE — 96365 THER/PROPH/DIAG IV INF INIT: CPT

## 2021-06-25 PROCEDURE — 160029 HCHG SURGERY MINUTES - 1ST 30 MINS LEVEL 4: Performed by: NEUROLOGICAL SURGERY

## 2021-06-25 PROCEDURE — 80048 BASIC METABOLIC PNL TOTAL CA: CPT

## 2021-06-25 PROCEDURE — 95955 EEG DURING SURGERY: CPT | Performed by: NEUROLOGICAL SURGERY

## 2021-06-25 PROCEDURE — 160048 HCHG OR STATISTICAL LEVEL 1-5: Performed by: NEUROLOGICAL SURGERY

## 2021-06-25 PROCEDURE — 160035 HCHG PACU - 1ST 60 MINS PHASE I: Performed by: NEUROLOGICAL SURGERY

## 2021-06-25 PROCEDURE — 500368 HCHG DRAIN, 7MM FLAT-FLUTED: Performed by: NEUROLOGICAL SURGERY

## 2021-06-25 PROCEDURE — 700105 HCHG RX REV CODE 258: Performed by: ANESTHESIOLOGY

## 2021-06-25 PROCEDURE — 99226 PR SUBSEQUENT OBSERVATION CARE,LEVEL III: CPT | Performed by: FAMILY MEDICINE

## 2021-06-25 PROCEDURE — 84703 CHORIONIC GONADOTROPIN ASSAY: CPT

## 2021-06-25 PROCEDURE — 501838 HCHG SUTURE GENERAL: Performed by: NEUROLOGICAL SURGERY

## 2021-06-25 PROCEDURE — 110454 HCHG SHELL REV 250: Performed by: NEUROLOGICAL SURGERY

## 2021-06-25 PROCEDURE — 00NY0ZZ RELEASE LUMBAR SPINAL CORD, OPEN APPROACH: ICD-10-PCS | Performed by: NEUROLOGICAL SURGERY

## 2021-06-25 RX ORDER — CEFAZOLIN SODIUM 1 G/3ML
INJECTION, POWDER, FOR SOLUTION INTRAMUSCULAR; INTRAVENOUS
Status: DISCONTINUED | OUTPATIENT
Start: 2021-06-25 | End: 2021-06-25 | Stop reason: HOSPADM

## 2021-06-25 RX ORDER — ENEMA 19; 7 G/133ML; G/133ML
1 ENEMA RECTAL
Status: DISCONTINUED | OUTPATIENT
Start: 2021-06-25 | End: 2021-06-26 | Stop reason: HOSPADM

## 2021-06-25 RX ORDER — BUPIVACAINE HYDROCHLORIDE 5 MG/ML
INJECTION, SOLUTION EPIDURAL; INTRACAUDAL
Status: DISCONTINUED | OUTPATIENT
Start: 2021-06-25 | End: 2021-06-25 | Stop reason: HOSPADM

## 2021-06-25 RX ORDER — AMOXICILLIN 250 MG
1 CAPSULE ORAL NIGHTLY
Status: DISCONTINUED | OUTPATIENT
Start: 2021-06-25 | End: 2021-06-26 | Stop reason: HOSPADM

## 2021-06-25 RX ORDER — ONDANSETRON 4 MG/1
4 TABLET, ORALLY DISINTEGRATING ORAL EVERY 4 HOURS PRN
Status: DISCONTINUED | OUTPATIENT
Start: 2021-06-25 | End: 2021-06-26 | Stop reason: HOSPADM

## 2021-06-25 RX ORDER — ONDANSETRON 2 MG/ML
4 INJECTION INTRAMUSCULAR; INTRAVENOUS EVERY 4 HOURS PRN
Status: DISCONTINUED | OUTPATIENT
Start: 2021-06-25 | End: 2021-06-26 | Stop reason: HOSPADM

## 2021-06-25 RX ORDER — DEXAMETHASONE SODIUM PHOSPHATE 4 MG/ML
INJECTION, SOLUTION INTRA-ARTICULAR; INTRALESIONAL; INTRAMUSCULAR; INTRAVENOUS; SOFT TISSUE PRN
Status: DISCONTINUED | OUTPATIENT
Start: 2021-06-25 | End: 2021-06-25 | Stop reason: SURG

## 2021-06-25 RX ORDER — HYDROMORPHONE HYDROCHLORIDE 1 MG/ML
0.5 INJECTION, SOLUTION INTRAMUSCULAR; INTRAVENOUS; SUBCUTANEOUS
Status: DISCONTINUED | OUTPATIENT
Start: 2021-06-25 | End: 2021-06-26 | Stop reason: HOSPADM

## 2021-06-25 RX ORDER — HYDRALAZINE HYDROCHLORIDE 20 MG/ML
5 INJECTION INTRAMUSCULAR; INTRAVENOUS
Status: DISCONTINUED | OUTPATIENT
Start: 2021-06-25 | End: 2021-06-25 | Stop reason: HOSPADM

## 2021-06-25 RX ORDER — PROMETHAZINE HYDROCHLORIDE 25 MG/1
12.5-25 SUPPOSITORY RECTAL EVERY 4 HOURS PRN
Status: DISCONTINUED | OUTPATIENT
Start: 2021-06-25 | End: 2021-06-26 | Stop reason: HOSPADM

## 2021-06-25 RX ORDER — OXYCODONE HYDROCHLORIDE AND ACETAMINOPHEN 5; 325 MG/1; MG/1
1 TABLET ORAL
Status: DISCONTINUED | OUTPATIENT
Start: 2021-06-25 | End: 2021-06-25 | Stop reason: HOSPADM

## 2021-06-25 RX ORDER — LABETALOL HYDROCHLORIDE 5 MG/ML
5 INJECTION, SOLUTION INTRAVENOUS
Status: DISCONTINUED | OUTPATIENT
Start: 2021-06-25 | End: 2021-06-25 | Stop reason: HOSPADM

## 2021-06-25 RX ORDER — ONDANSETRON 2 MG/ML
INJECTION INTRAMUSCULAR; INTRAVENOUS PRN
Status: DISCONTINUED | OUTPATIENT
Start: 2021-06-25 | End: 2021-06-25 | Stop reason: SURG

## 2021-06-25 RX ORDER — SODIUM CHLORIDE, SODIUM LACTATE, POTASSIUM CHLORIDE, CALCIUM CHLORIDE 600; 310; 30; 20 MG/100ML; MG/100ML; MG/100ML; MG/100ML
INJECTION, SOLUTION INTRAVENOUS
Status: DISCONTINUED | OUTPATIENT
Start: 2021-06-25 | End: 2021-06-25 | Stop reason: SURG

## 2021-06-25 RX ORDER — CEFAZOLIN SODIUM 2 G/100ML
2 INJECTION, SOLUTION INTRAVENOUS EVERY 8 HOURS
Status: COMPLETED | OUTPATIENT
Start: 2021-06-25 | End: 2021-06-25

## 2021-06-25 RX ORDER — ALPRAZOLAM 0.5 MG/1
0.5 TABLET ORAL 3 TIMES DAILY PRN
Status: DISCONTINUED | OUTPATIENT
Start: 2021-06-25 | End: 2021-06-26 | Stop reason: HOSPADM

## 2021-06-25 RX ORDER — METOPROLOL TARTRATE 1 MG/ML
1 INJECTION, SOLUTION INTRAVENOUS
Status: DISCONTINUED | OUTPATIENT
Start: 2021-06-25 | End: 2021-06-25 | Stop reason: HOSPADM

## 2021-06-25 RX ORDER — DIPHENHYDRAMINE HCL 25 MG
25 TABLET ORAL EVERY 6 HOURS PRN
Status: DISCONTINUED | OUTPATIENT
Start: 2021-06-25 | End: 2021-06-26 | Stop reason: HOSPADM

## 2021-06-25 RX ORDER — HYDROMORPHONE HYDROCHLORIDE 1 MG/ML
0.4 INJECTION, SOLUTION INTRAMUSCULAR; INTRAVENOUS; SUBCUTANEOUS
Status: DISCONTINUED | OUTPATIENT
Start: 2021-06-25 | End: 2021-06-25 | Stop reason: HOSPADM

## 2021-06-25 RX ORDER — BISACODYL 10 MG
10 SUPPOSITORY, RECTAL RECTAL
Status: DISCONTINUED | OUTPATIENT
Start: 2021-06-25 | End: 2021-06-26 | Stop reason: HOSPADM

## 2021-06-25 RX ORDER — BUPIVACAINE HYDROCHLORIDE AND EPINEPHRINE 5; 5 MG/ML; UG/ML
INJECTION, SOLUTION PERINEURAL
Status: DISCONTINUED | OUTPATIENT
Start: 2021-06-25 | End: 2021-06-25 | Stop reason: HOSPADM

## 2021-06-25 RX ORDER — PROMETHAZINE HYDROCHLORIDE 25 MG/1
12.5-25 TABLET ORAL EVERY 4 HOURS PRN
Status: DISCONTINUED | OUTPATIENT
Start: 2021-06-25 | End: 2021-06-26 | Stop reason: HOSPADM

## 2021-06-25 RX ORDER — DEXAMETHASONE SODIUM PHOSPHATE 4 MG/ML
4 INJECTION, SOLUTION INTRA-ARTICULAR; INTRALESIONAL; INTRAMUSCULAR; INTRAVENOUS; SOFT TISSUE EVERY 6 HOURS
Status: DISCONTINUED | OUTPATIENT
Start: 2021-06-25 | End: 2021-06-25

## 2021-06-25 RX ORDER — ROCURONIUM BROMIDE 10 MG/ML
INJECTION, SOLUTION INTRAVENOUS PRN
Status: DISCONTINUED | OUTPATIENT
Start: 2021-06-25 | End: 2021-06-25 | Stop reason: SURG

## 2021-06-25 RX ORDER — SODIUM CHLORIDE 9 MG/ML
INJECTION, SOLUTION INTRAVENOUS CONTINUOUS
Status: DISCONTINUED | OUTPATIENT
Start: 2021-06-25 | End: 2021-06-26

## 2021-06-25 RX ORDER — HYDROMORPHONE HYDROCHLORIDE 1 MG/ML
0.1 INJECTION, SOLUTION INTRAMUSCULAR; INTRAVENOUS; SUBCUTANEOUS
Status: DISCONTINUED | OUTPATIENT
Start: 2021-06-25 | End: 2021-06-25 | Stop reason: HOSPADM

## 2021-06-25 RX ORDER — OXYCODONE HYDROCHLORIDE 10 MG/1
10 TABLET ORAL
Status: DISCONTINUED | OUTPATIENT
Start: 2021-06-25 | End: 2021-06-26 | Stop reason: HOSPADM

## 2021-06-25 RX ORDER — MIDAZOLAM HYDROCHLORIDE 1 MG/ML
INJECTION INTRAMUSCULAR; INTRAVENOUS PRN
Status: DISCONTINUED | OUTPATIENT
Start: 2021-06-25 | End: 2021-06-25 | Stop reason: SURG

## 2021-06-25 RX ORDER — CLONIDINE HYDROCHLORIDE 0.1 MG/1
0.1 TABLET ORAL EVERY 4 HOURS PRN
Status: DISCONTINUED | OUTPATIENT
Start: 2021-06-25 | End: 2021-06-26 | Stop reason: HOSPADM

## 2021-06-25 RX ORDER — OXYCODONE HYDROCHLORIDE AND ACETAMINOPHEN 5; 325 MG/1; MG/1
2 TABLET ORAL
Status: DISCONTINUED | OUTPATIENT
Start: 2021-06-25 | End: 2021-06-25 | Stop reason: HOSPADM

## 2021-06-25 RX ORDER — DOCUSATE SODIUM 100 MG/1
100 CAPSULE, LIQUID FILLED ORAL 2 TIMES DAILY
Status: DISCONTINUED | OUTPATIENT
Start: 2021-06-25 | End: 2021-06-26 | Stop reason: HOSPADM

## 2021-06-25 RX ORDER — DIPHENHYDRAMINE HYDROCHLORIDE 50 MG/ML
12.5 INJECTION INTRAMUSCULAR; INTRAVENOUS
Status: DISCONTINUED | OUTPATIENT
Start: 2021-06-25 | End: 2021-06-25 | Stop reason: HOSPADM

## 2021-06-25 RX ORDER — AMOXICILLIN 250 MG
1 CAPSULE ORAL
Status: DISCONTINUED | OUTPATIENT
Start: 2021-06-25 | End: 2021-06-26 | Stop reason: HOSPADM

## 2021-06-25 RX ORDER — HALOPERIDOL 5 MG/ML
1 INJECTION INTRAMUSCULAR
Status: DISCONTINUED | OUTPATIENT
Start: 2021-06-25 | End: 2021-06-25 | Stop reason: HOSPADM

## 2021-06-25 RX ORDER — HYDROMORPHONE HYDROCHLORIDE 2 MG/ML
INJECTION, SOLUTION INTRAMUSCULAR; INTRAVENOUS; SUBCUTANEOUS PRN
Status: DISCONTINUED | OUTPATIENT
Start: 2021-06-25 | End: 2021-06-25 | Stop reason: SURG

## 2021-06-25 RX ORDER — OXYCODONE HYDROCHLORIDE 5 MG/1
5 TABLET ORAL
Status: DISCONTINUED | OUTPATIENT
Start: 2021-06-25 | End: 2021-06-26 | Stop reason: HOSPADM

## 2021-06-25 RX ORDER — VITAMIN B COMPLEX
5000 TABLET ORAL DAILY
Status: DISCONTINUED | OUTPATIENT
Start: 2021-06-25 | End: 2021-06-26 | Stop reason: HOSPADM

## 2021-06-25 RX ORDER — HYDROMORPHONE HYDROCHLORIDE 1 MG/ML
0.2 INJECTION, SOLUTION INTRAMUSCULAR; INTRAVENOUS; SUBCUTANEOUS
Status: DISCONTINUED | OUTPATIENT
Start: 2021-06-25 | End: 2021-06-25 | Stop reason: HOSPADM

## 2021-06-25 RX ORDER — CEFAZOLIN SODIUM 1 G/3ML
INJECTION, POWDER, FOR SOLUTION INTRAMUSCULAR; INTRAVENOUS PRN
Status: DISCONTINUED | OUTPATIENT
Start: 2021-06-25 | End: 2021-06-25 | Stop reason: SURG

## 2021-06-25 RX ORDER — CALCIUM CARBONATE 500 MG/1
500 TABLET, CHEWABLE ORAL 2 TIMES DAILY
Status: DISCONTINUED | OUTPATIENT
Start: 2021-06-25 | End: 2021-06-26 | Stop reason: HOSPADM

## 2021-06-25 RX ORDER — ACETAMINOPHEN 500 MG
1000 TABLET ORAL EVERY 6 HOURS PRN
Status: DISCONTINUED | OUTPATIENT
Start: 2021-06-25 | End: 2021-06-25

## 2021-06-25 RX ORDER — SODIUM CHLORIDE, SODIUM LACTATE, POTASSIUM CHLORIDE, CALCIUM CHLORIDE 600; 310; 30; 20 MG/100ML; MG/100ML; MG/100ML; MG/100ML
INJECTION, SOLUTION INTRAVENOUS CONTINUOUS
Status: DISCONTINUED | OUTPATIENT
Start: 2021-06-25 | End: 2021-06-25 | Stop reason: HOSPADM

## 2021-06-25 RX ORDER — DIPHENHYDRAMINE HYDROCHLORIDE 50 MG/ML
25 INJECTION INTRAMUSCULAR; INTRAVENOUS EVERY 6 HOURS PRN
Status: DISCONTINUED | OUTPATIENT
Start: 2021-06-25 | End: 2021-06-26 | Stop reason: HOSPADM

## 2021-06-25 RX ADMIN — CEFAZOLIN SODIUM 2 G: 2 INJECTION, SOLUTION INTRAVENOUS at 15:27

## 2021-06-25 RX ADMIN — SUGAMMADEX 300 MG: 100 INJECTION, SOLUTION INTRAVENOUS at 08:10

## 2021-06-25 RX ADMIN — SODIUM CHLORIDE: 9 INJECTION, SOLUTION INTRAVENOUS at 12:04

## 2021-06-25 RX ADMIN — DEXAMETHASONE 4 MG: 4 TABLET ORAL at 23:23

## 2021-06-25 RX ADMIN — DEXAMETHASONE 4 MG: 4 TABLET ORAL at 12:03

## 2021-06-25 RX ADMIN — ROCURONIUM BROMIDE 60 MG: 10 INJECTION, SOLUTION INTRAVENOUS at 07:37

## 2021-06-25 RX ADMIN — DEXAMETHASONE 4 MG: 4 TABLET ORAL at 05:08

## 2021-06-25 RX ADMIN — HYDROMORPHONE HYDROCHLORIDE 1 MG: 2 INJECTION, SOLUTION INTRAMUSCULAR; INTRAVENOUS; SUBCUTANEOUS at 07:57

## 2021-06-25 RX ADMIN — ANTACID TABLETS 500 MG: 500 TABLET, CHEWABLE ORAL at 18:08

## 2021-06-25 RX ADMIN — OXYCODONE HYDROCHLORIDE 10 MG: 10 TABLET ORAL at 23:24

## 2021-06-25 RX ADMIN — DEXAMETHASONE 4 MG: 4 TABLET ORAL at 18:08

## 2021-06-25 RX ADMIN — OXYCODONE HYDROCHLORIDE 10 MG: 10 TABLET ORAL at 18:08

## 2021-06-25 RX ADMIN — CEFAZOLIN SODIUM 2 G: 2 INJECTION, SOLUTION INTRAVENOUS at 23:23

## 2021-06-25 RX ADMIN — OXYCODONE 5 MG: 5 TABLET ORAL at 12:11

## 2021-06-25 RX ADMIN — FAMOTIDINE 20 MG: 20 TABLET ORAL at 04:59

## 2021-06-25 RX ADMIN — SODIUM CHLORIDE, POTASSIUM CHLORIDE, SODIUM LACTATE AND CALCIUM CHLORIDE: 600; 310; 30; 20 INJECTION, SOLUTION INTRAVENOUS at 07:36

## 2021-06-25 RX ADMIN — FAMOTIDINE 20 MG: 20 TABLET ORAL at 18:08

## 2021-06-25 RX ADMIN — MIDAZOLAM HYDROCHLORIDE 2 MG: 1 INJECTION, SOLUTION INTRAMUSCULAR; INTRAVENOUS at 07:29

## 2021-06-25 RX ADMIN — GABAPENTIN 300 MG: 300 CAPSULE ORAL at 14:42

## 2021-06-25 RX ADMIN — DOCUSATE SODIUM 50 MG AND SENNOSIDES 8.6 MG 1 TABLET: 8.6; 5 TABLET, FILM COATED ORAL at 21:25

## 2021-06-25 RX ADMIN — FENTANYL CITRATE 100 MCG: 50 INJECTION, SOLUTION INTRAMUSCULAR; INTRAVENOUS at 07:37

## 2021-06-25 RX ADMIN — PROPOFOL 150 MG: 10 INJECTION, EMULSION INTRAVENOUS at 07:37

## 2021-06-25 RX ADMIN — HALOPERIDOL LACTATE 1 MG: 5 INJECTION, SOLUTION INTRAMUSCULAR at 09:48

## 2021-06-25 RX ADMIN — DEXAMETHASONE SODIUM PHOSPHATE 8 MG: 4 INJECTION, SOLUTION INTRA-ARTICULAR; INTRALESIONAL; INTRAMUSCULAR; INTRAVENOUS; SOFT TISSUE at 07:31

## 2021-06-25 RX ADMIN — CEFAZOLIN 3 G: 330 INJECTION, POWDER, FOR SOLUTION INTRAMUSCULAR; INTRAVENOUS at 07:32

## 2021-06-25 RX ADMIN — SODIUM CHLORIDE: 9 INJECTION, SOLUTION INTRAVENOUS at 22:28

## 2021-06-25 RX ADMIN — ONDANSETRON 4 MG: 2 INJECTION INTRAMUSCULAR; INTRAVENOUS at 08:58

## 2021-06-25 RX ADMIN — GABAPENTIN 300 MG: 300 CAPSULE ORAL at 04:59

## 2021-06-25 RX ADMIN — Medication 5000 UNITS: at 12:03

## 2021-06-25 RX ADMIN — GABAPENTIN 300 MG: 300 CAPSULE ORAL at 21:25

## 2021-06-25 RX ADMIN — ANTACID TABLETS 500 MG: 500 TABLET, CHEWABLE ORAL at 12:04

## 2021-06-25 ASSESSMENT — ENCOUNTER SYMPTOMS
WHEEZING: 0
MYALGIAS: 0
NERVOUS/ANXIOUS: 0
DIARRHEA: 0
CHILLS: 0
COUGH: 0
BACK PAIN: 1
TINGLING: 1
FLANK PAIN: 0
SORE THROAT: 0
SENSORY CHANGE: 1
SHORTNESS OF BREATH: 0
HEADACHES: 0
VOMITING: 0
DIAPHORESIS: 0
NECK PAIN: 0
DIZZINESS: 0
SPEECH CHANGE: 0
PALPITATIONS: 0
FEVER: 0
NAUSEA: 0
BLURRED VISION: 0
ABDOMINAL PAIN: 0
WEAKNESS: 1
FOCAL WEAKNESS: 1
HEARTBURN: 0

## 2021-06-25 ASSESSMENT — PAIN DESCRIPTION - PAIN TYPE
TYPE: SURGICAL PAIN
TYPE: ACUTE PAIN
TYPE: SURGICAL PAIN
TYPE: ACUTE PAIN
TYPE: ACUTE PAIN

## 2021-06-25 ASSESSMENT — PAIN SCALES - GENERAL: PAIN_LEVEL: 3

## 2021-06-25 NOTE — ANESTHESIA POSTPROCEDURE EVALUATION
Patient: Molly Olson    Procedure Summary     Date: 06/25/21 Room / Location: [facility] OR 08 / SURGERY [facility]    Anesthesia Start: 0729 Anesthesia Stop: 0941    Procedure: LAMINECTOMY, SPINE, LUMBAR, WITH DISCECTOMY FOR L4-5 LAMINOTOMY AND MICRODISCECTOMY (Right Back) Diagnosis: (LUMBAR 4/5 DISC HERNIATION)    Surgeons: Martin Richardson III, M.D. Responsible Provider: Jorge Key D.O.    Anesthesia Type: general ASA Status: 3          Final Anesthesia Type: general  Last vitals  BP   Blood Pressure: 134/71    Temp   36.1 °C (97 °F)    Pulse   62   Resp   18    SpO2   97 %      Anesthesia Post Evaluation    Patient location during evaluation: PACU  Patient participation: complete - patient participated  Level of consciousness: awake and alert  Pain score: 3    Airway patency: patent  Anesthetic complications: no  Cardiovascular status: hemodynamically stable  Respiratory status: acceptable  Hydration status: euvolemic    PONV: none          No complications documented.     Nurse Pain Score: 3 (NPRS)

## 2021-06-25 NOTE — PROGRESS NOTES
[facility] Medicine Daily Progress Note    Date of Service  6/25/2021    Chief Complaint  36 y.o. female admitted 6/21/2021 with intractable back pain.    Hospital Course  Admitted with intractable back pain, Neurosurgery was consulted on the case.  MRI showed an L4-L5 migrated disc herniation on the right causing foraminal narrowing.  There was no central critical occlusion noted.  Plan was Interventional radiology consult for an epidural.  However it appeared that scheduling might be delayed.  Plan of care was then changed to surgical intervention.    Interval Problem Update  Back pain - pain controlled, surgery done today    Consultants/Specialty  Neurosurgery    Code Status  Full Code    Disposition  TBD    Review of Systems  Review of Systems   Constitutional: Negative for chills, diaphoresis, fever and malaise/fatigue.   HENT: Negative for congestion, hearing loss and sore throat.    Eyes: Negative for blurred vision.   Respiratory: Negative for cough, shortness of breath and wheezing.    Cardiovascular: Negative for chest pain, palpitations and leg swelling.   Gastrointestinal: Negative for abdominal pain, diarrhea, heartburn, nausea and vomiting.   Genitourinary: Negative for dysuria, flank pain and hematuria.   Musculoskeletal: Positive for back pain. Negative for joint pain, myalgias and neck pain.   Skin: Negative for rash.   Neurological: Positive for tingling, sensory change, focal weakness and weakness. Negative for dizziness, speech change and headaches.   Psychiatric/Behavioral: The patient is not nervous/anxious.         Physical Exam  Temp:  [36.1 °C (97 °F)-36.7 °C (98.1 °F)] 36.1 °C (97 °F)  Pulse:  [] 54  Resp:  [12-24] 16  BP: (123-148)/(70-82) 131/76  SpO2:  [92 %-97 %] 94 %    Physical Exam  Vitals and nursing note reviewed.   Constitutional:       Appearance: She is obese.   HENT:      Head: Normocephalic and atraumatic.      Nose: No congestion.      Mouth/Throat:      Mouth: Mucous  membranes are moist.   Eyes:      Extraocular Movements: Extraocular movements intact.      Conjunctiva/sclera: Conjunctivae normal.      Pupils: Pupils are equal, round, and reactive to light.   Cardiovascular:      Rate and Rhythm: Normal rate and regular rhythm.   Pulmonary:      Effort: Pulmonary effort is normal.      Breath sounds: Normal breath sounds.   Abdominal:      General: Bowel sounds are normal. There is no distension.      Palpations: Abdomen is soft.      Tenderness: There is no abdominal tenderness. There is no guarding or rebound.   Musculoskeletal:      Cervical back: No tenderness.      Right lower leg: No edema.      Left lower leg: No edema.   Skin:     General: Skin is warm and dry.   Neurological:      Mental Status: She is alert and oriented to person, place, and time.      Cranial Nerves: No cranial nerve deficit.      Motor: Weakness (MMT BUE 5/5, LLE 4+/5, RLE 4/5 ) present.         Fluids    Intake/Output Summary (Last 24 hours) at 6/25/2021 1347  Last data filed at 6/25/2021 1137  Gross per 24 hour   Intake 700 ml   Output 30 ml   Net 670 ml       Laboratory  Recent Labs     06/23/21  0420 06/24/21  0300 06/25/21  0411   WBC 13.3* 12.7* 14.2*   RBC 5.02 4.88 5.02   HEMOGLOBIN 14.1 14.0 14.6   HEMATOCRIT 42.1 40.2 42.0   MCV 83.9 82.4 83.7   MCH 28.1 28.7 29.1   MCHC 33.5* 34.8 34.8   RDW 40.2 39.9 40.7   PLATELETCT 316 299 294   MPV 10.5 10.0 10.8     Recent Labs     06/24/21  0300 06/25/21  0411   SODIUM 139 138   POTASSIUM 4.1 3.8   CHLORIDE 111 107   CO2 19* 20   GLUCOSE 137* 133*   BUN 19 16   CREATININE 0.68 0.71   CALCIUM 8.1* 8.2*     Recent Labs     06/23/21  0420   APTT 32.4   INR 1.19*               Imaging  DX-PORTABLE FLUOROSCOPY < 1 HOUR Is the patient pregnant? No   Final Result      Portable fluoroscopy utilized for 18 seconds.         INTERPRETING LOCATION: [address] [place] [address], [facility]      DX-SPINE-ANY ONE VIEW   Final Result      Portable fluoroscopy as described.            Assessment/Plan  * Intractable back pain- (present on admission)  Assessment & Plan  Lumbar Laminotomy and Microdiscectomy L4-L5 6/25/2021  Pain control  PT and OT evaluations      Leukocytosis- (present on admission)  Assessment & Plan  Likely secondary to steroids    Elevated TSH- (present on admission)  Assessment & Plan  Synthroid    Morbid obesity (HCC)- (present on admission)  Assessment & Plan  Body mass index is 41.12 kg/m².       VTE prophylaxis: SCD

## 2021-06-25 NOTE — PROGRESS NOTES
Assessment complete.  A&O x 4. Patient calls appropriately.  Patient ambulates with standby assist. Bed alarm off. Low fall risk   Patient has 4/10 pain. Pain managed with prescribed medications.  Denies N&V. Tolerating reg diet. NPO at midnight.  + void, + flatus, - BM.  Patient denies SOB.  SCD's off.    Review plan with of care with patient. Call light and personal belongings with in reach. Hourly rounding in place. All needs met at this time.

## 2021-06-25 NOTE — ANESTHESIA PROCEDURE NOTES
Airway    Date/Time: 6/25/2021 7:38 AM  Performed by: Jorge Key D.O.  Authorized by: Jorge Key D.O.     Location:  OR  Urgency:  Elective  Indications for Airway Management:  Anesthesia      Spontaneous Ventilation: absent    Sedation Level:  Deep  Preoxygenated: Yes    Patient Position:  Sniffing  Mask Difficulty Assessment:  1 - vent by mask  Final Airway Type:  Endotracheal airway  Final Endotracheal Airway:  ETT  Cuffed: Yes    Technique Used for Successful ETT Placement:  Direct laryngoscopy    Insertion Site:  Oral  Blade Type:  Ciro  Laryngoscope Blade/Videolaryngoscope Blade Size:  3  ETT Size (mm):  7.5  Measured from:  Lips  ETT to Lips (cm):  21  Placement Verified by: auscultation and capnometry    Cormack-Lehane Classification:  Grade I - full view of glottis  Number of Attempts at Approach:  1

## 2021-06-25 NOTE — OR NURSING
0935- Pt arrives to PACU from OR on 5L of oxygen via mask. Report received. Dressing to back CDI. Linens changed upon arrival, pt denies pain at this time. Pt states numbness in R foot and slight tingling in L foot, improvement than before surgery per pt. Pt able to move all extremities.     0952- Pt medicated for nausea,  Ambrocio called and updated on pt status and POC.     1019- Pt resting comfortably at this time, report called to Danica SHERMAN. Signed and held orders discussed.     1027- Pt taken to room by transport tech on 5L of oxygen (tank at 515). Pt states pain 0/10 upon leaving PACU.

## 2021-06-25 NOTE — OR SURGEON
Immediate Post OP Note    PreOp Diagnosis: Lumbar Spondylosis with Radiculopathy and L5-L6 Left HNP    PostOp Diagnosis: Same      Procedure(s):  LAMINECTOMY, SPINE, LUMBAR, WITH DISCECTOMY FOR L4-5 LAMINOTOMY AND MICRODISCECTOMY - Wound Class: Clean    Surgeon(s):  Martin Richardson III, M.D.    Anesthesiologist/Type of Anesthesia:  Anesthesiologist: Jorge Key D.O./General    Surgical Staff:  Assistant: Riley Duff P.A.-C.  Circulator: Danica Bates R.N.  Scrub Person: Paola Zapata  Radiology Technologist: Terra العلي    Specimens removed if any:  * No specimens in log *    Estimated Blood Loss: 10cc    Findings: See Op Note.      Complications: None        6/25/2021 9:47 AM Riley Duff P.A.-C.

## 2021-06-25 NOTE — ANESTHESIA TIME REPORT
Anesthesia Start and Stop Event Times     Date Time Event    6/25/2021 0710 Ready for Procedure     0729 Anesthesia Start     0941 Anesthesia Stop        Responsible Staff  06/25/21    Name Role Begin End    Jorge Key D.O. Anesth 0729 0941        Preop Diagnosis (Free Text):  Pre-op Diagnosis     LUMBAR 4/5 DISC HERNIATION        Preop Diagnosis (Codes):    Post op Diagnosis  Lumbar disc herniation      Premium Reason  Non-Premium    Comments:

## 2021-06-25 NOTE — PROGRESS NOTES
Patient returned to unit from PACU at this time. Pt is A&O x4. Reports tolerable pain 3/10 to surgical site on back. Pt is on 5L as she is sleeping, RN will titrate as appropriate today. SCD's placed on patient. Post op vitals in place. Pt reports numbness/tingling to RLE, reports normal sensation to all other extremities. Bilateral pedal pulses are 2+ normal. Pt denies nausea. Call light within reach. Hourly rounding in place.

## 2021-06-25 NOTE — CARE PLAN
The patient is Watcher - Medium risk of patient condition declining or worsening    Shift Goals  Clinical Goals: pain control and up to chair for meals  Patient Goals: pain control     Progress made toward(s) clinical / shift goals:  patient reports good pain control at this time. Pt agreeable to sit up for meals.   Problem: Pain - Standard  Goal: Alleviation of pain or a reduction in pain to the patient’s comfort goal  Outcome: Progressing     Problem: Fall Risk  Goal: Patient will remain free from falls  Outcome: Progressing     Problem: Psychosocial  Goal: Patient's level of anxiety will decrease  Outcome: Progressing     Problem: Communication  Goal: The ability to communicate needs accurately and effectively will improve  Outcome: Progressing       Patient is not progressing towards the following goals:

## 2021-06-25 NOTE — ANESTHESIA PREPROCEDURE EVALUATION
Relevant Problems   No relevant active problems       Physical Exam    Airway   Mallampati: II  TM distance: >3 FB  Neck ROM: full       Cardiovascular - normal exam  Rhythm: regular  Rate: normal  (-) murmur     Dental - normal exam           Pulmonary - normal exam  Breath sounds clear to auscultation     Abdominal    Neurological - normal exam                 Anesthesia Plan    ASA 3   ASA physical status 3 criteria: morbid obesity - BMI greater than or equal to 40    Plan - general       Airway plan will be ETT          Induction: intravenous    Postoperative Plan: Postoperative administration of opioids is intended.    Pertinent diagnostic labs and testing reviewed    Informed Consent:    Anesthetic plan and risks discussed with patient.    Use of blood products discussed with: patient whom consented to blood products.

## 2021-06-25 NOTE — CARE PLAN
The patient is Stable - Low risk of patient condition declining or worsening    Shift Goals  Clinical Goals: Pre-op education & pain control    Progress made toward(s) clinical / shift goals: Explain plan of care for the night and expectations to prepare for surgery. All questions answered at this time. Pain being assessed and interventions being provided as indicated.    Problem: Knowledge Deficit - Standard  Goal: Patient and family/care givers will demonstrate understanding of plan of care, disease process/condition, diagnostic tests and medications  Outcome: Progressing     Problem: Pain - Standard  Goal: Alleviation of pain or a reduction in pain to the patient’s comfort goal  Outcome: Progressing     Problem: Fall Risk  Goal: Patient will remain free from falls  Outcome: Progressing     Problem: Psychosocial  Goal: Patient's level of anxiety will decrease  Outcome: Progressing

## 2021-06-26 VITALS
WEIGHT: 293 LBS | BODY MASS INDEX: 41.95 KG/M2 | HEART RATE: 61 BPM | OXYGEN SATURATION: 93 % | RESPIRATION RATE: 16 BRPM | TEMPERATURE: 97.9 F | HEIGHT: 70 IN | DIASTOLIC BLOOD PRESSURE: 78 MMHG | SYSTOLIC BLOOD PRESSURE: 133 MMHG

## 2021-06-26 LAB
ANION GAP SERPL CALC-SCNC: 7 MMOL/L (ref 7–16)
BASOPHILS # BLD AUTO: 0.2 % (ref 0–1.8)
BASOPHILS # BLD: 0.03 K/UL (ref 0–0.12)
BUN SERPL-MCNC: 15 MG/DL (ref 8–22)
CALCIUM SERPL-MCNC: 8.3 MG/DL (ref 8.5–10.5)
CHLORIDE SERPL-SCNC: 106 MMOL/L (ref 96–112)
CO2 SERPL-SCNC: 26 MMOL/L (ref 20–33)
CREAT SERPL-MCNC: 0.86 MG/DL (ref 0.5–1.4)
EOSINOPHIL # BLD AUTO: 0 K/UL (ref 0–0.51)
EOSINOPHIL NFR BLD: 0 % (ref 0–6.9)
ERYTHROCYTE [DISTWIDTH] IN BLOOD BY AUTOMATED COUNT: 39.7 FL (ref 35.9–50)
GLUCOSE SERPL-MCNC: 124 MG/DL (ref 65–99)
HCT VFR BLD AUTO: 42.4 % (ref 37–47)
HGB BLD-MCNC: 14.5 G/DL (ref 12–16)
IMM GRANULOCYTES # BLD AUTO: 0.11 K/UL (ref 0–0.11)
IMM GRANULOCYTES NFR BLD AUTO: 0.7 % (ref 0–0.9)
LYMPHOCYTES # BLD AUTO: 1.79 K/UL (ref 1–4.8)
LYMPHOCYTES NFR BLD: 11.6 % (ref 22–41)
MCH RBC QN AUTO: 28.3 PG (ref 27–33)
MCHC RBC AUTO-ENTMCNC: 34.2 G/DL (ref 33.6–35)
MCV RBC AUTO: 82.8 FL (ref 81.4–97.8)
MONOCYTES # BLD AUTO: 0.94 K/UL (ref 0–0.85)
MONOCYTES NFR BLD AUTO: 6.1 % (ref 0–13.4)
NEUTROPHILS # BLD AUTO: 12.62 K/UL (ref 2–7.15)
NEUTROPHILS NFR BLD: 81.4 % (ref 44–72)
NRBC # BLD AUTO: 0 K/UL
NRBC BLD-RTO: 0 /100 WBC
PLATELET # BLD AUTO: 289 K/UL (ref 164–446)
PMV BLD AUTO: 10.2 FL (ref 9–12.9)
POTASSIUM SERPL-SCNC: 4.4 MMOL/L (ref 3.6–5.5)
RBC # BLD AUTO: 5.12 M/UL (ref 4.2–5.4)
SODIUM SERPL-SCNC: 139 MMOL/L (ref 135–145)
WBC # BLD AUTO: 15.5 K/UL (ref 4.8–10.8)

## 2021-06-26 PROCEDURE — 700102 HCHG RX REV CODE 250 W/ 637 OVERRIDE(OP): Performed by: PHYSICIAN ASSISTANT

## 2021-06-26 PROCEDURE — 85025 COMPLETE CBC W/AUTO DIFF WBC: CPT

## 2021-06-26 PROCEDURE — A9270 NON-COVERED ITEM OR SERVICE: HCPCS | Performed by: FAMILY MEDICINE

## 2021-06-26 PROCEDURE — A9270 NON-COVERED ITEM OR SERVICE: HCPCS | Performed by: PHYSICIAN ASSISTANT

## 2021-06-26 PROCEDURE — 99217 PR OBSERVATION CARE DISCHARGE: CPT | Performed by: FAMILY MEDICINE

## 2021-06-26 PROCEDURE — 700102 HCHG RX REV CODE 250 W/ 637 OVERRIDE(OP): Performed by: NEUROLOGICAL SURGERY

## 2021-06-26 PROCEDURE — 36415 COLL VENOUS BLD VENIPUNCTURE: CPT

## 2021-06-26 PROCEDURE — 700111 HCHG RX REV CODE 636 W/ 250 OVERRIDE (IP): Performed by: PHYSICIAN ASSISTANT

## 2021-06-26 PROCEDURE — A9270 NON-COVERED ITEM OR SERVICE: HCPCS | Performed by: NEUROLOGICAL SURGERY

## 2021-06-26 PROCEDURE — 700102 HCHG RX REV CODE 250 W/ 637 OVERRIDE(OP): Performed by: FAMILY MEDICINE

## 2021-06-26 PROCEDURE — 96372 THER/PROPH/DIAG INJ SC/IM: CPT

## 2021-06-26 PROCEDURE — 80048 BASIC METABOLIC PNL TOTAL CA: CPT

## 2021-06-26 PROCEDURE — 97161 PT EVAL LOW COMPLEX 20 MIN: CPT

## 2021-06-26 PROCEDURE — G0378 HOSPITAL OBSERVATION PER HR: HCPCS

## 2021-06-26 RX ORDER — DEXAMETHASONE 4 MG/1
4 TABLET ORAL DAILY
Status: DISCONTINUED | OUTPATIENT
Start: 2021-06-29 | End: 2021-06-26 | Stop reason: HOSPADM

## 2021-06-26 RX ORDER — OXYCODONE HYDROCHLORIDE 10 MG/1
10 TABLET ORAL EVERY 6 HOURS PRN
Qty: 20 TABLET | Refills: 0 | Status: SHIPPED | OUTPATIENT
Start: 2021-06-26 | End: 2021-07-01

## 2021-06-26 RX ORDER — DEXAMETHASONE 4 MG/1
4 TABLET ORAL EVERY 8 HOURS
Status: DISCONTINUED | OUTPATIENT
Start: 2021-06-26 | End: 2021-06-26 | Stop reason: HOSPADM

## 2021-06-26 RX ORDER — DIAZEPAM 2 MG/1
2 TABLET ORAL EVERY 6 HOURS PRN
Qty: 20 TABLET | Refills: 0 | Status: SHIPPED | OUTPATIENT
Start: 2021-06-26 | End: 2021-07-01

## 2021-06-26 RX ORDER — DEXAMETHASONE 4 MG/1
4 TABLET ORAL EVERY 12 HOURS
Status: DISCONTINUED | OUTPATIENT
Start: 2021-06-27 | End: 2021-06-26 | Stop reason: HOSPADM

## 2021-06-26 RX ORDER — DEXAMETHASONE 4 MG/1
TABLET ORAL
Qty: 8 TABLET | Refills: 0 | Status: SHIPPED | OUTPATIENT
Start: 2021-06-27 | End: 2021-07-01

## 2021-06-26 RX ADMIN — GABAPENTIN 300 MG: 300 CAPSULE ORAL at 04:56

## 2021-06-26 RX ADMIN — OXYCODONE HYDROCHLORIDE 10 MG: 10 TABLET ORAL at 08:56

## 2021-06-26 RX ADMIN — OXYCODONE HYDROCHLORIDE 10 MG: 10 TABLET ORAL at 12:28

## 2021-06-26 RX ADMIN — ANTACID TABLETS 500 MG: 500 TABLET, CHEWABLE ORAL at 04:56

## 2021-06-26 RX ADMIN — FAMOTIDINE 20 MG: 20 TABLET ORAL at 04:57

## 2021-06-26 RX ADMIN — ENOXAPARIN SODIUM 40 MG: 40 INJECTION SUBCUTANEOUS at 09:01

## 2021-06-26 RX ADMIN — OXYCODONE HYDROCHLORIDE 10 MG: 10 TABLET ORAL at 05:14

## 2021-06-26 RX ADMIN — Medication 5000 UNITS: at 04:57

## 2021-06-26 RX ADMIN — DEXAMETHASONE 4 MG: 4 TABLET ORAL at 04:56

## 2021-06-26 ASSESSMENT — GAIT ASSESSMENTS
DISTANCE (FEET): 350
GAIT LEVEL OF ASSIST: SUPERVISED
DEVIATION: BRADYKINETIC

## 2021-06-26 ASSESSMENT — COGNITIVE AND FUNCTIONAL STATUS - GENERAL
SUGGESTED CMS G CODE MODIFIER MOBILITY: CJ
WALKING IN HOSPITAL ROOM: A LITTLE
MOBILITY SCORE: 22
CLIMB 3 TO 5 STEPS WITH RAILING: A LITTLE

## 2021-06-26 ASSESSMENT — PAIN DESCRIPTION - PAIN TYPE
TYPE: ACUTE PAIN
TYPE: ACUTE PAIN

## 2021-06-26 ASSESSMENT — FIBROSIS 4 INDEX: FIB4 SCORE: 0.42

## 2021-06-26 NOTE — CARE PLAN
Problem: Knowledge Deficit - Standard  Goal: Patient and family/care givers will demonstrate understanding of plan of care, disease process/condition, diagnostic tests and medications  Outcome: Progressing     Problem: Depression  Goal: Patient and family/caregiver will verbalize accurate information about at least two of the possible causes of depression, three-four of the signs and symptoms of depression  Outcome: Progressing     Problem: Pain - Standard  Goal: Alleviation of pain or a reduction in pain to the patient’s comfort goal  Outcome: Progressing     Problem: Fall Risk  Goal: Patient will remain free from falls  Outcome: Progressing     Problem: Psychosocial  Goal: Patient's level of anxiety will decrease  Outcome: Progressing  Goal: Patient's ability to verbalize feelings about condition will improve  Outcome: Progressing  Goal: Patient's ability to re-evaluate and adapt role responsibilities will improve  Outcome: Progressing  Goal: Patient and family will demonstrate ability to cope with life altering diagnosis and/or procedure  Outcome: Progressing  Goal: Spiritual and cultural needs incorporated into hospitalization  Outcome: Progressing     Problem: Communication  Goal: The ability to communicate needs accurately and effectively will improve  Outcome: Progressing     Problem: Discharge Barriers/Planning  Goal: Patient's continuum of care needs are met  Outcome: Progressing     Problem: Hemodynamics  Goal: Patient's hemodynamics, fluid balance and neurologic status will be stable or improve  Outcome: Progressing     Problem: Respiratory  Goal: Patient will achieve/maintain optimum respiratory ventilation and gas exchange  Outcome: Progressing     Problem: Fluid Volume  Goal: Fluid volume balance will be maintained  Outcome: Progressing     Problem: Nutrition  Goal: Patient's nutritional and fluid intake will be adequate or improve  Outcome: Progressing  Goal: Enteral nutrition will be maintained or  improve  Outcome: Progressing  Goal: Enteral nutrition will be maintained or improve  Outcome: Progressing     Problem: Bowel Elimination  Goal: Establish and maintain regular bowel function  Outcome: Progressing     Problem: Gastrointestinal Irritability  Goal: Nausea and vomiting will be absent or improve  Outcome: Progressing  Goal: Diarrhea will be absent or improved  Outcome: Progressing     Problem: Mobility  Goal: Patient's capacity to carry out activities will improve  Outcome: Progressing     Problem: Self Care  Goal: Patient will have the ability to perform ADLs independently or with assistance (bathe, groom, dress, toilet and feed)  Outcome: Progressing     Problem: Infection - Standard  Goal: Patient will remain free from infection  Outcome: Progressing   The patient is Stable - Low risk of patient condition declining or worsening    Shift Goals  Clinical Goals: mobility, pain control  Patient Goals: mobility, pain control    Progress made toward(s) clinical / shift goals:  yes    Patient is not progressing towards the following goals:

## 2021-06-26 NOTE — CARE PLAN
Problem: Knowledge Deficit - Standard  Goal: Patient and family/care givers will demonstrate understanding of plan of care, disease process/condition, diagnostic tests and medications  Outcome: Progressing     Problem: Pain - Standard  Goal: Alleviation of pain or a reduction in pain to the patient’s comfort goal  Outcome: Progressing     Problem: Fall Risk  Goal: Patient will remain free from falls  Outcome: Progressing     The patient is Stable - Low risk of patient condition declining or worsening    Shift Goals  Clinical Goals: pain control, rest, ambulating   Patient Goals: pain control, rest    Progress made toward(s) clinical / shift goals: Medicated per MAR. Cluster care performed. Pt encouraged to perform active range of motion and able to ambulate to restroom.     Patient is not progressing towards the following goals:

## 2021-06-26 NOTE — PROGRESS NOTES
Neurosurgery Progress Note    Subjective:  POD1 L4-5 laminotomy and microdiscectomy  Patient doing well   Ambulating  Voiding   Tolerating PO nutrition   Pain well controlled  No new numbness, weakness or tingling   RLE pain improved     Exam:  4+/5 R AT, otherwise 5/5 throughout  Dressing CDI  Drain output minimal     BP  Min: 121/67  Max: 152/84  Pulse  Av.6  Min: 48  Max: 92  Resp  Av.5  Min: 16  Max: 19  Temp  Av.3 °C (97.4 °F)  Min: 36.1 °C (97 °F)  Max: 36.6 °C (97.9 °F)  SpO2  Av %  Min: 91 %  Max: 96 %    No data recorded    Recent Labs     21  0300 21  04121  040   WBC 12.7* 14.2* 15.5*   RBC 4.88 5.02 5.12   HEMOGLOBIN 14.0 14.6 14.5   HEMATOCRIT 40.2 42.0 42.4   MCV 82.4 83.7 82.8   MCH 28.7 29.1 28.3   MCHC 34.8 34.8 34.2   RDW 39.9 40.7 39.7   PLATELETCT 299 294 289   MPV 10.0 10.8 10.2     Recent Labs     21  03021  0407   SODIUM 139 138 139   POTASSIUM 4.1 3.8 4.4   CHLORIDE 111 107 106   CO2 19* 20 26   GLUCOSE 137* 133* 124*   BUN 19 16 15   CREATININE 0.68 0.71 0.86   CALCIUM 8.1* 8.2* 8.3*               Intake/Output                       21 - 21 0659 21 - 21 0659     1900-0659 Total  Total                 Intake    I.V.  700  1200 1900  --  -- --    Volume (mL) (Lactated Ringers) 700 -- 700 -- -- --    Volume (mL) (NS infusion) -- 1200 1200 -- -- --    Total Intake 700 1200 1900 -- -- --       Output    Urine  --  -- --  --  -- --    Number of Times Voided 3 x -- 3 x 1 x -- 1 x    Drains  60  33 93  0  -- 0    Output (mL) (Closed/Suction Drain [facility] 60 33 93 0 -- 0    Stool  --  -- --  --  -- --    Number of Times Stooled 0 x -- 0 x -- -- --    Blood  30  -- 30  --  -- --    Est. Blood Loss 30 -- 30 -- -- --    Total Output 90 33 123 0 -- 0       Net I/O     610 6002 5248 0 -- 0            Intake/Output Summary (Last 24 hours) at  6/26/2021 1139  Last data filed at 6/26/2021 0702  Gross per 24 hour   Intake 1200 ml   Output 93 ml   Net 1107 ml            • dexamethasone  4 mg Q8HRS   • [START ON 6/27/2021] dexamethasone  4 mg Q12HRS   • [START ON 6/29/2021] dexamethasone  4 mg DAILY   • MD ALERT...DO NOT ADMINISTER NSAIDS or ASPIRIN unless ORDERED By Neurosurgery  1 Each PRN   • docusate sodium  100 mg BID   • senna-docusate  1 tablet Nightly   • senna-docusate  1 tablet Q24HRS PRN   • magnesium hydroxide  30 mL QDAY PRN   • bisacodyl  10 mg Q24HRS PRN   • fleet  1 Each Once PRN   • enoxaparin  40 mg DAILY AT 1800   • oxyCODONE immediate-release  5 mg Q3HRS PRN    Or   • oxyCODONE immediate-release  10 mg Q3HRS PRN    Or   • HYDROmorphone  0.5 mg Q3HRS PRN   • ondansetron  4 mg Q4HRS PRN   • ondansetron  4 mg Q4HRS PRN   • promethazine  12.5-25 mg Q4HRS PRN   • promethazine  12.5-25 mg Q4HRS PRN   • diphenhydrAMINE  25 mg Q6HRS PRN    Or   • diphenhydrAMINE  25 mg Q6HRS PRN   • ALPRAZolam  0.5 mg TID PRN   • cloNIDine  0.1 mg Q4HRS PRN   • benzocaine-menthol  1 Lozenge Q2HRS PRN   • calcium carbonate  500 mg BID   • vitamin D  5,000 Units DAILY   • famotidine  20 mg BID   • citalopram  10 mg DAILY   • cyclobenzaprine  10 mg TID PRN   • levothyroxine  50 mcg AM ES   • acetaminophen  650 mg Q6HRS PRN   • enalaprilat  1.25 mg Q6HRS PRN   • labetalol  10 mg Q4HRS PRN   • prochlorperazine  5-10 mg Q4HRS PRN   • diazePAM  2 mg Q6HRS PRN   • gabapentin  300 mg TID       Assessment and Plan:  POD #1    OK to DC to home from surgery standpoint  DC drain prior to discharge  F/u with [facility] in 2 weeks  Keep incision covered x 4 days  Dressing changes PRN   OK to shower when drain is removed  Continue muscle relaxer and oral pain control   Ice incision frequently  Encourage daily stool softener    Prophylactic anticoagulation: yes         Start date/time: OK from surgery standpoint

## 2021-06-26 NOTE — PROGRESS NOTES
Patient's IV removed and discharge instructions given to patient and , both voiced understanding. Patient discharged to home with  via wheelchair.

## 2021-06-26 NOTE — PROGRESS NOTES
Received report from previous shift RN  Assessment complete.  A&O x 4. Patient calls appropriately.  Patient ambulates with hand held assist.   Patient has 3/10 pain. Pain managed with prescribed medications.  Denies N&V. Tolerating regular diet.  Surgical site to back with dressing in place, CDI.  + void, + flatus, last BM 6/24.  Patient denies SOB.    Patient sitting in bed. Family at bedside. Review plan with of care with patient. Call light and personal belongings with in reach. Hourly rounding in place. All needs met at this time.

## 2021-06-26 NOTE — DISCHARGE SUMMARY
Discharge Summary    CHIEF COMPLAINT ON ADMISSION  No chief complaint on file.      Reason for Admission  l4-5 disc herniation     Admission Date  6/21/2021    CODE STATUS  Full Code    HPI & HOSPITAL COURSE  This is a 36 y.o. female here with intractable back pain.  Neurosurgery was consulted on the case.  MRI showed an L4-L5 migrated disc herniation on the right causing foraminal narrowing.  There was no central critical occlusion noted.  Plan was Interventional radiology consult for an epidural.  She was started on Decadron and pain control medications. However it appeared that scheduling might be delayed. Plan of care was then changed to surgical intervention.  Patient then underwent Right posterior MIS approach for bilateral L5 laminotomy, decompression of thecal sac and nerve roots, Right posterior MIS approach for bilateral S1 laminotomy, decompression of thecal sac and nerve roots, Right S1 transpedicular approach, 59 modifier, Microdiskectomy at L5-S1, Epidural injection on 6/25/2021.  Patient tolerated procedure well and she had marked improvement of her pain and symptoms.  Neurosurgery has cleared her for discharge.      Therefore, she is discharged in good and stable condition to home with close outpatient follow-up.    The patient recovered much more quickly than anticipated on admission.    Discharge Date  6/26/2021    FOLLOW UP ITEMS POST DISCHARGE  Follow-up with Neurosurgery    DISCHARGE DIAGNOSES  Principal Problem:    Intractable back pain POA: Yes  Active Problems:    Elevated TSH POA: Yes    Leukocytosis POA: Yes    Morbid obesity (HCC) POA: Yes  Resolved Problems:    * No resolved hospital problems. *      FOLLOW UP  No future appointments.  [facility]  [address]  [address]  [place] [id number]  [phone]  In 2 weeks        MEDICATIONS ON DISCHARGE     Medication List      START taking these medications      Instructions   dexamethasone 4 MG Tabs  Start taking on: June  27, 2021  Commonly known as: DECADRON   Take 1 tablet by mouth every 12 hours for 2 days, THEN 1 tablet every 12 hours for 2 days.     diazePAM 2 MG Tabs  Commonly known as: VALIUM   Take 1 tablet by mouth every 6 hours as needed (spasm) for up to 5 days.  Dose: 2 mg     oxyCODONE immediate release 10 MG immediate release tablet  Commonly known as: ROXICODONE   Take 1 tablet by mouth every 6 hours as needed for Moderate Pain or Severe Pain for up to 5 days.  Dose: 10 mg        CONTINUE taking these medications      Instructions   cyclobenzaprine 10 mg Tabs  Commonly known as: Flexeril   Take 10 mg by mouth 3 times a day as needed.  Dose: 10 mg     gabapentin 100 MG Caps  Commonly known as: NEURONTIN   Take 100 mg by mouth 3 times a day.  Dose: 100 mg     thyroid 30 MG Tabs  Commonly known as: ARMOUR THYROID   Take 30 mg by mouth every day. Indications: Underactive Thyroid  Dose: 30 mg        STOP taking these medications    acetaminophen 500 MG Tabs  Commonly known as: TYLENOL     ibuprofen 200 MG Tabs  Commonly known as: MOTRIN     methylPREDNISolone 4 MG Tabs  Commonly known as: MEDROL            Allergies  No Known Allergies    DIET  No orders of the defined types were placed in this encounter.      ACTIVITY  Light duty.  Weight bearing as tolerated    CONSULTATIONS  Neurosurgery - [facility]    PROCEDURES  Right posterior MIS approach for bilateral L5 laminotomy, decompression of thecal sac and nerve roots. Right posterior MIS approach for bilateral S1 laminotomy, decompression of thecal sac and nerve roots. Right S1 transpedicular approach, 59 modifier. Microdiskectomy at L5-S1. Epidural injection. 6/25/2021    LABORATORY  Lab Results   Component Value Date    SODIUM 139 06/26/2021    POTASSIUM 4.4 06/26/2021    CHLORIDE 106 06/26/2021    CO2 26 06/26/2021    GLUCOSE 124 (H) 06/26/2021    BUN 15 06/26/2021    CREATININE 0.86 06/26/2021        Lab Results   Component Value Date    WBC 15.5 (H) 06/26/2021     HEMOGLOBIN 14.5 06/26/2021    HEMATOCRIT 42.4 06/26/2021    PLATELETCT 289 06/26/2021        Total time of the discharge process exceeds 30 minutes.

## 2021-06-26 NOTE — THERAPY
"Physical Therapy   Initial Evaluation     Patient Name: Molly Olson  Age:  36 y.o., Sex:  female  Medical Record #: [id number]  Today's Date: 6/26/2021     Precautions: Spinal / Back Precautions     Assessment  Patient is 36 y.o. female presenting with low back pain and RLE radiculopathy now s/p L4-5 laminectomy and microdiscectomy on 6/25.  Pt educated on spinal precautions and safe mobility with gait and stair navigation. Pt mobilized as below in chart demonstrating understand of all teachings. Pt has no further acute PT needs, but discussed the possibility of outpatient PT with pt if she continues to have weakness in her RLE.     Plan    Recommend Physical Therapy for Evaluation only.    DC Equipment Recommendations: None  Discharge Recommendations: Recommend outpatient physical therapy services to address higher level deficits       Subjective    \"My right leg feels so much better, my pain was so bad before surgery.\"     Objective       06/26/21 0919   Prior Living Situation   Prior Services Home-Independent   Housing / Facility 2 Story House   Steps Into Home 0   Steps In Home 14   Rail Right Rail  (Steps in Home)   Bathroom Set up Walk In Shower   Equipment Owned None   Lives with - Patient's Self Care Capacity Spouse;Adult Children  (, 19yr old son, 12yr old dtr)   Comments pts  works during the day, her 19yr old son will be working from home and can help as needed, and so can her 12 yr old dtr   Prior Level of Functional Mobility   Bed Mobility Independent   Transfer Status Independent   Ambulation Independent   Distance Ambulation (Feet)   (community)   Assistive Devices Used None   Stairs Independent   History of Falls   History of Falls No   Cognition    Cognition / Consciousness WDL   Level of Consciousness Alert   Comments pleasant and cooperative, motivated to get moving   Passive ROM Lower Body   Passive ROM Lower Body WDL   Active ROM Lower Body    Active ROM Lower Body  WDL "   Strength Lower Body   Lower Body Strength  WDL   Comments RLE still weaker than LLE, as was pre-surgery   Sensation Lower Body   Lower Extremity Sensation   WDL   Lower Body Muscle Tone   Lower Body Muscle Tone  WDL   Neurological Concerns   Neurological Concerns No   Coordination Lower Body    Coordination Lower Body  WDL   Balance Assessment   Sitting Balance (Static) Fair +   Sitting Balance (Dynamic) Fair +   Standing Balance (Static) Fair   Standing Balance (Dynamic) Fair   Weight Shift Sitting Good   Weight Shift Standing Fair   Comments w/o A.D.; no LOB   Gait Analysis   Gait Level Of Assist Supervised   Assistive Device None   Distance (Feet) 350   # of Times Distance was Traveled 1   Deviation Bradykinetic   # of Stairs Climbed 5   Level of Assist with Stairs Supervised   Weight Bearing Status FWB   Comments pt given cues for sequencing on stairs    Bed Mobility    Supine to Sit Supervised   Sit to Supine   (NT; up in chair at end of session )   Scooting Supervised   Rolling Supervised   Comments pt given cues for log roll technique; performed with no rails and HOB flat    Functional Mobility   Sit to Stand Supervised   Bed, Chair, Wheelchair Transfer Supervised   Comments no device    Activity Tolerance   Sitting in Chair up in chair post session   Sitting Edge of Bed 4mins   Standing 10mins   Education Group   Education Provided Role of Physical Therapist;Spine Precautions   Spine Precautions Patient Response Patient;Acceptance;Explanation;Demonstration;Verbal Demonstration;Action Demonstration   Role of Physical Therapist Patient Response Patient;Acceptance;Explanation;Demonstration;Handout;Verbal Demonstration;Action Demonstration

## 2021-06-26 NOTE — PROGRESS NOTES
ISABELLA drain discontinued per order, patient tolerated well. Pressure dressing applied. Instructed patient's  on dressing changes as needed.

## 2021-07-09 ENCOUNTER — HOSPITAL ENCOUNTER (OUTPATIENT)
Dept: RADIOLOGY | Facility: MEDICAL CENTER | Age: 36
End: 2021-07-09
Attending: PHYSICIAN ASSISTANT
Payer: COMMERCIAL

## 2021-07-09 DIAGNOSIS — M54.16 LUMBAR RADICULOPATHY: ICD-10-CM

## 2021-07-09 DIAGNOSIS — Z98.890 STATUS POST LUMBAR SURGERY: ICD-10-CM

## 2021-07-09 PROCEDURE — 93971 EXTREMITY STUDY: CPT | Mod: RT

## (undated) DEVICE — ARMREST CRADLE FOAM - (2PR/PK 12PR/CA)

## (undated) DEVICE — TUBING CLEARLINK DUO-VENT - C-FLO (48EA/CA)

## (undated) DEVICE — PACK NEURO - (2EA/CA)

## (undated) DEVICE — SUCTION INSTRUMENT YANKAUER BULBOUS TIP W/O VENT (50EA/CA)

## (undated) DEVICE — GLOVE BIOGEL INDICATOR SZ 8 SURGICAL PF LTX - (50/BX 4BX/CA)

## (undated) DEVICE — HEADREST PRONEVIEW LARGE - (10/CA)

## (undated) DEVICE — ELECTRODE DUAL RETURN W/ CORD - (50/PK)

## (undated) DEVICE — DRAPE LAPAROTOMY T SHEET - (12EA/CA)

## (undated) DEVICE — NEPTUNE 4 PORT MANIFOLD - (20/PK)

## (undated) DEVICE — DRAIN J-VAC 7MM FLAT - (10EA/CA)

## (undated) DEVICE — SLEEVE, VASO, THIGH, MED

## (undated) DEVICE — RESERVOIR SUCTION 100 CC - SILICONE (20EA/CA)

## (undated) DEVICE — TUBE E-T HI-LO CUFF 7.5MM (10EA/PK)

## (undated) DEVICE — GOWN SURGEONS LARGE - (32/CA)

## (undated) DEVICE — SENSOR SPO2 NEO LNCS ADHESIVE (20/BX) SEE USER NOTES

## (undated) DEVICE — LACTATED RINGERS INJ 1000 ML - (14EA/CA 60CA/PF)

## (undated) DEVICE — TOOL DISSECT MATCH HEAD

## (undated) DEVICE — GOWN WARMING X-LARGE FLEX - (20/CA)

## (undated) DEVICE — CANISTER SUCTION 3000ML MECHANICAL FILTER AUTO SHUTOFF MEDI-VAC NONSTERILE LF DISP  (40EA/CA)

## (undated) DEVICE — KIT SURGIFLO W/OUT THROMBIN - (6EA/CA)

## (undated) DEVICE — CHLORAPREP 26 ML APPLICATOR - ORANGE TINT(25/CA)

## (undated) DEVICE — TOWEL STOP TIMEOUT SAFETY FLAG (40EA/CA)

## (undated) DEVICE — PROTECTOR ULNA NERVE - (36PR/CA)

## (undated) DEVICE — ELECTRODE 850 FOAM ADHESIVE - HYDROGEL RADIOTRNSPRNT (50/PK)

## (undated) DEVICE — TUBING C&T SET FLYING LEADS DRAIN TUBING (10EA/BX)

## (undated) DEVICE — SODIUM CHL IRRIGATION 0.9% 1000ML (12EA/CA)

## (undated) DEVICE — LACTATED RINGERS INJ. 500 ML - (24EA/CA)

## (undated) DEVICE — SUTURE GENERAL

## (undated) DEVICE — INTRAOP NEURO IN OR 1:1 PER 15 MIN

## (undated) DEVICE — HEMOSTAT SURG ABSORBABLE - 4 X 8 IN SURGICEL (24EA/CA)

## (undated) DEVICE — SET LEADWIRE 5 LEAD BEDSIDE DISPOSABLE ECG (1SET OF 5/EA)

## (undated) DEVICE — GOWN WARMING STANDARD FLEX - (30/CA)

## (undated) DEVICE — DRESSING POST OP BORDER 4INX6IN AG (70/CA)

## (undated) DEVICE — BOVIE BLADE COATED &INSULATED - 25/PK

## (undated) DEVICE — DRAPE STRLE REG TOWEL 18X24 - (10/BX 4BX/CA)"

## (undated) DEVICE — MASK ANESTHESIA ADULT  - (100/CA)

## (undated) DEVICE — SET EPIDURAL BURRON NON-SAFETY (12EA/CA)

## (undated) DEVICE — BOVIE  BLADE 6 EXTENDED - (50/PK)

## (undated) DEVICE — STERI STRIP COMPOUND BENZOIN - TINCTURE 0.6ML WITH APPLICATOR (40EA/BX)

## (undated) DEVICE — MIDAS LUBRICATOR DIFFUSER PACK (4EA/CA)

## (undated) DEVICE — Device

## (undated) DEVICE — SUTURE 2-0 VICRYL PLUS CT-1 - 8 X 18 INCH(12/BX)

## (undated) DEVICE — KIT ANESTHESIA W/CIRCUIT & 3/LT BAG W/FILTER (20EA/CA)

## (undated) DEVICE — GLOVE BIOGEL SZ 8 SURGICAL PF LTX - (50PR/BX 4BX/CA)

## (undated) DEVICE — SET EXTENSION WITH 2 PORTS (48EA/CA) ***PART #2C8610 IS A SUBSTITUTE*****

## (undated) DEVICE — DRAPE MICROSCOPE X-LONG (10EA/CA)

## (undated) DEVICE — SUTURE 0 VICRYL PLUS CT-1 - 8 X 18 INCH (12/BX)

## (undated) DEVICE — DRAPE LARGE 3 QUARTER - (20/CA)